# Patient Record
Sex: FEMALE | Race: WHITE | NOT HISPANIC OR LATINO | URBAN - METROPOLITAN AREA
[De-identification: names, ages, dates, MRNs, and addresses within clinical notes are randomized per-mention and may not be internally consistent; named-entity substitution may affect disease eponyms.]

---

## 2018-02-11 ENCOUNTER — EMERGENCY (EMERGENCY)
Facility: HOSPITAL | Age: 83
LOS: 1 days | Discharge: HOME | End: 2018-02-11
Attending: EMERGENCY MEDICINE | Admitting: EMERGENCY MEDICINE
Payer: MEDICARE

## 2018-02-11 VITALS
DIASTOLIC BLOOD PRESSURE: 65 MMHG | SYSTOLIC BLOOD PRESSURE: 107 MMHG | TEMPERATURE: 100 F | OXYGEN SATURATION: 95 % | HEART RATE: 102 BPM | RESPIRATION RATE: 17 BRPM

## 2018-02-11 VITALS
SYSTOLIC BLOOD PRESSURE: 128 MMHG | TEMPERATURE: 99 F | HEART RATE: 96 BPM | DIASTOLIC BLOOD PRESSURE: 73 MMHG | RESPIRATION RATE: 18 BRPM | OXYGEN SATURATION: 95 %

## 2018-02-11 LAB
ALBUMIN SERPL ELPH-MCNC: 4.1 G/DL — SIGNIFICANT CHANGE UP (ref 3.3–5)
ALP SERPL-CCNC: 69 U/L — SIGNIFICANT CHANGE UP (ref 40–120)
ALT FLD-CCNC: 13 U/L RC — SIGNIFICANT CHANGE UP (ref 10–45)
ANION GAP SERPL CALC-SCNC: 13 MMOL/L — SIGNIFICANT CHANGE UP (ref 5–17)
APPEARANCE UR: CLEAR — SIGNIFICANT CHANGE UP
APTT BLD: 141.4 SEC — CRITICAL HIGH (ref 27.5–37.4)
APTT BLD: 144.8 SEC — CRITICAL HIGH (ref 27.5–37.4)
AST SERPL-CCNC: 20 U/L — SIGNIFICANT CHANGE UP (ref 10–40)
BASE EXCESS BLDV CALC-SCNC: 3.8 MMOL/L — HIGH (ref -2–2)
BASE EXCESS BLDV CALC-SCNC: 4.3 MMOL/L — HIGH (ref -2–2)
BASOPHILS # BLD AUTO: 0 K/UL — SIGNIFICANT CHANGE UP (ref 0–0.2)
BASOPHILS NFR BLD AUTO: 0 % — SIGNIFICANT CHANGE UP (ref 0–2)
BILIRUB SERPL-MCNC: 0.5 MG/DL — SIGNIFICANT CHANGE UP (ref 0.2–1.2)
BILIRUB UR-MCNC: NEGATIVE — SIGNIFICANT CHANGE UP
BUN SERPL-MCNC: 32 MG/DL — HIGH (ref 7–23)
CA-I SERPL-SCNC: 1.14 MMOL/L — SIGNIFICANT CHANGE UP (ref 1.12–1.3)
CA-I SERPL-SCNC: 1.16 MMOL/L — SIGNIFICANT CHANGE UP (ref 1.12–1.3)
CALCIUM SERPL-MCNC: 9.3 MG/DL — SIGNIFICANT CHANGE UP (ref 8.4–10.5)
CHLORIDE BLDV-SCNC: 110 MMOL/L — HIGH (ref 96–108)
CHLORIDE BLDV-SCNC: 111 MMOL/L — HIGH (ref 96–108)
CHLORIDE SERPL-SCNC: 104 MMOL/L — SIGNIFICANT CHANGE UP (ref 96–108)
CO2 BLDV-SCNC: 30 MMOL/L — SIGNIFICANT CHANGE UP (ref 22–30)
CO2 BLDV-SCNC: 32 MMOL/L — HIGH (ref 22–30)
CO2 SERPL-SCNC: 29 MMOL/L — SIGNIFICANT CHANGE UP (ref 22–31)
COLOR SPEC: YELLOW — SIGNIFICANT CHANGE UP
CREAT SERPL-MCNC: 1.15 MG/DL — SIGNIFICANT CHANGE UP (ref 0.5–1.3)
DIFF PNL FLD: NEGATIVE — SIGNIFICANT CHANGE UP
EOSINOPHIL # BLD AUTO: 0 K/UL — SIGNIFICANT CHANGE UP (ref 0–0.5)
EOSINOPHIL NFR BLD AUTO: 0 % — SIGNIFICANT CHANGE UP (ref 0–6)
GAS PNL BLDV: 141 MMOL/L — SIGNIFICANT CHANGE UP (ref 136–145)
GAS PNL BLDV: 144 MMOL/L — SIGNIFICANT CHANGE UP (ref 136–145)
GAS PNL BLDV: SIGNIFICANT CHANGE UP
GLUCOSE BLDV-MCNC: 115 MG/DL — HIGH (ref 70–99)
GLUCOSE BLDV-MCNC: 146 MG/DL — HIGH (ref 70–99)
GLUCOSE SERPL-MCNC: 149 MG/DL — HIGH (ref 70–99)
GLUCOSE UR QL: NEGATIVE — SIGNIFICANT CHANGE UP
HCO3 BLDV-SCNC: 29 MMOL/L — SIGNIFICANT CHANGE UP (ref 21–29)
HCO3 BLDV-SCNC: 31 MMOL/L — HIGH (ref 21–29)
HCT VFR BLD CALC: 39.3 % — SIGNIFICANT CHANGE UP (ref 34.5–45)
HCT VFR BLDA CALC: 37 % — LOW (ref 39–50)
HCT VFR BLDA CALC: 40 % — SIGNIFICANT CHANGE UP (ref 39–50)
HGB BLD CALC-MCNC: 12.1 G/DL — SIGNIFICANT CHANGE UP (ref 11.5–15.5)
HGB BLD CALC-MCNC: 13.1 G/DL — SIGNIFICANT CHANGE UP (ref 11.5–15.5)
HGB BLD-MCNC: 12.8 G/DL — SIGNIFICANT CHANGE UP (ref 11.5–15.5)
INR BLD: 1.03 RATIO — SIGNIFICANT CHANGE UP (ref 0.88–1.16)
KETONES UR-MCNC: NEGATIVE — SIGNIFICANT CHANGE UP
LACTATE BLDV-MCNC: 1.3 MMOL/L — SIGNIFICANT CHANGE UP (ref 0.7–2)
LACTATE BLDV-MCNC: 2.4 MMOL/L — HIGH (ref 0.7–2)
LEUKOCYTE ESTERASE UR-ACNC: NEGATIVE — SIGNIFICANT CHANGE UP
LYMPHOCYTES # BLD AUTO: 0.7 K/UL — LOW (ref 1–3.3)
LYMPHOCYTES # BLD AUTO: 7.5 % — LOW (ref 13–44)
MCHC RBC-ENTMCNC: 29.4 PG — SIGNIFICANT CHANGE UP (ref 27–34)
MCHC RBC-ENTMCNC: 32.6 GM/DL — SIGNIFICANT CHANGE UP (ref 32–36)
MCV RBC AUTO: 90 FL — SIGNIFICANT CHANGE UP (ref 80–100)
MONOCYTES # BLD AUTO: 0.5 K/UL — SIGNIFICANT CHANGE UP (ref 0–0.9)
MONOCYTES NFR BLD AUTO: 4.9 % — SIGNIFICANT CHANGE UP (ref 2–14)
NEUTROPHILS # BLD AUTO: 8.1 K/UL — HIGH (ref 1.8–7.4)
NEUTROPHILS NFR BLD AUTO: 87.6 % — HIGH (ref 43–77)
NITRITE UR-MCNC: NEGATIVE — SIGNIFICANT CHANGE UP
OTHER CELLS CSF MANUAL: 5 ML/DL — LOW (ref 18–22)
PCO2 BLDV: 49 MMHG — SIGNIFICANT CHANGE UP (ref 35–50)
PCO2 BLDV: 57 MMHG — HIGH (ref 35–50)
PH BLDV: 7.35 — SIGNIFICANT CHANGE UP (ref 7.35–7.45)
PH BLDV: 7.39 — SIGNIFICANT CHANGE UP (ref 7.35–7.45)
PH UR: 5.5 — SIGNIFICANT CHANGE UP (ref 5–8)
PLATELET # BLD AUTO: 252 K/UL — SIGNIFICANT CHANGE UP (ref 150–400)
PO2 BLDV: 18 MMHG — LOW (ref 25–45)
PO2 BLDV: 21 MMHG — LOW (ref 25–45)
POTASSIUM BLDV-SCNC: 3.4 MMOL/L — LOW (ref 3.5–5)
POTASSIUM BLDV-SCNC: 4.1 MMOL/L — SIGNIFICANT CHANGE UP (ref 3.5–5)
POTASSIUM SERPL-MCNC: 3.9 MMOL/L — SIGNIFICANT CHANGE UP (ref 3.5–5.3)
POTASSIUM SERPL-SCNC: 3.9 MMOL/L — SIGNIFICANT CHANGE UP (ref 3.5–5.3)
PROT SERPL-MCNC: 7.5 G/DL — SIGNIFICANT CHANGE UP (ref 6–8.3)
PROT UR-MCNC: SIGNIFICANT CHANGE UP
PROTHROM AB SERPL-ACNC: 11.1 SEC — SIGNIFICANT CHANGE UP (ref 9.8–12.7)
RBC # BLD: 4.37 M/UL — SIGNIFICANT CHANGE UP (ref 3.8–5.2)
RBC # FLD: 14.3 % — SIGNIFICANT CHANGE UP (ref 10.3–14.5)
SAO2 % BLDV: 22 % — LOW (ref 67–88)
SAO2 % BLDV: 30 % — LOW (ref 67–88)
SODIUM SERPL-SCNC: 146 MMOL/L — HIGH (ref 135–145)
SP GR SPEC: 1.02 — SIGNIFICANT CHANGE UP (ref 1.01–1.02)
UROBILINOGEN FLD QL: NEGATIVE — SIGNIFICANT CHANGE UP
WBC # BLD: 9.2 K/UL — SIGNIFICANT CHANGE UP (ref 3.8–10.5)
WBC # FLD AUTO: 9.2 K/UL — SIGNIFICANT CHANGE UP (ref 3.8–10.5)

## 2018-02-11 PROCEDURE — 84132 ASSAY OF SERUM POTASSIUM: CPT

## 2018-02-11 PROCEDURE — 87040 BLOOD CULTURE FOR BACTERIA: CPT

## 2018-02-11 PROCEDURE — 83605 ASSAY OF LACTIC ACID: CPT

## 2018-02-11 PROCEDURE — 87086 URINE CULTURE/COLONY COUNT: CPT

## 2018-02-11 PROCEDURE — 85027 COMPLETE CBC AUTOMATED: CPT

## 2018-02-11 PROCEDURE — 82330 ASSAY OF CALCIUM: CPT

## 2018-02-11 PROCEDURE — 71045 X-RAY EXAM CHEST 1 VIEW: CPT

## 2018-02-11 PROCEDURE — 85610 PROTHROMBIN TIME: CPT

## 2018-02-11 PROCEDURE — 87186 SC STD MICRODIL/AGAR DIL: CPT

## 2018-02-11 PROCEDURE — 80053 COMPREHEN METABOLIC PANEL: CPT

## 2018-02-11 PROCEDURE — 81003 URINALYSIS AUTO W/O SCOPE: CPT

## 2018-02-11 PROCEDURE — 82947 ASSAY GLUCOSE BLOOD QUANT: CPT

## 2018-02-11 PROCEDURE — 99283 EMERGENCY DEPT VISIT LOW MDM: CPT | Mod: 25

## 2018-02-11 PROCEDURE — 99284 EMERGENCY DEPT VISIT MOD MDM: CPT | Mod: 25,GC

## 2018-02-11 PROCEDURE — 85014 HEMATOCRIT: CPT

## 2018-02-11 PROCEDURE — 82435 ASSAY OF BLOOD CHLORIDE: CPT

## 2018-02-11 PROCEDURE — 71045 X-RAY EXAM CHEST 1 VIEW: CPT | Mod: 26

## 2018-02-11 PROCEDURE — 85730 THROMBOPLASTIN TIME PARTIAL: CPT

## 2018-02-11 PROCEDURE — 84295 ASSAY OF SERUM SODIUM: CPT

## 2018-02-11 PROCEDURE — 93010 ELECTROCARDIOGRAM REPORT: CPT

## 2018-02-11 PROCEDURE — 82803 BLOOD GASES ANY COMBINATION: CPT

## 2018-02-11 PROCEDURE — 93005 ELECTROCARDIOGRAM TRACING: CPT

## 2018-02-11 RX ORDER — SODIUM CHLORIDE 9 MG/ML
3 INJECTION INTRAMUSCULAR; INTRAVENOUS; SUBCUTANEOUS ONCE
Qty: 0 | Refills: 0 | Status: COMPLETED | OUTPATIENT
Start: 2018-02-11 | End: 2018-02-11

## 2018-02-11 RX ORDER — SODIUM CHLORIDE 9 MG/ML
1000 INJECTION INTRAMUSCULAR; INTRAVENOUS; SUBCUTANEOUS ONCE
Qty: 0 | Refills: 0 | Status: COMPLETED | OUTPATIENT
Start: 2018-02-11 | End: 2018-02-11

## 2018-02-11 RX ORDER — QUETIAPINE FUMARATE 200 MG/1
25 TABLET, FILM COATED ORAL ONCE
Qty: 0 | Refills: 0 | Status: COMPLETED | OUTPATIENT
Start: 2018-02-11 | End: 2018-02-11

## 2018-02-11 RX ORDER — QUETIAPINE FUMARATE 200 MG/1
25 TABLET, FILM COATED ORAL ONCE
Qty: 0 | Refills: 0 | Status: DISCONTINUED | OUTPATIENT
Start: 2018-02-11 | End: 2018-02-11

## 2018-02-11 RX ADMIN — QUETIAPINE FUMARATE 25 MILLIGRAM(S): 200 TABLET, FILM COATED ORAL at 17:16

## 2018-02-11 RX ADMIN — SODIUM CHLORIDE 3 MILLILITER(S): 9 INJECTION INTRAMUSCULAR; INTRAVENOUS; SUBCUTANEOUS at 13:10

## 2018-02-11 RX ADMIN — SODIUM CHLORIDE 2000 MILLILITER(S): 9 INJECTION INTRAMUSCULAR; INTRAVENOUS; SUBCUTANEOUS at 13:37

## 2018-02-11 NOTE — ED PROVIDER NOTE - PROGRESS NOTE DETAILS
**ATTENDING ADDENDUM (Dr. Elvin Patel): reviewed transfer documentation from the [facility] nursing [facility]. Advance directives noted. HCP: Daughter Melissa Whipple [phone], [phone]. PCP: Guzman Nolasco - [phone]. PTT elevated, unclear why, will repeat. VBG elevated will repeat after 1L, patient remains without abd pain- vomiting/diarrhea. will PO challenge. Spoke with Feral patient at baseline MS prior to sending does sundown and get agitated at times. and currently AOx1 slightly agitated but cooperative. will d/c back to NH -Alisha DO PTT still elevated, reviewed patient medications not on heparin or DOAC that would cause elevation. normal PT/INR, normal LFTs. no active bleeding. No clear etiology of isolated elevated PTT, without active bleeding will recommend d/c and outpatient Follow up -Alisha VIDAL VBG lactate resolved, tolerating liquids will feed. sundowning, will give PO home serqouel and put on 1:1 for sfety. will d/c -Alisha DO Attending Note (Jono): patient signed out pending repeat labs.  lactate normalized. ptt still elevated, unclear etiology. unlikely clinically significant.  recommend outpatient trending and follow up with pcp. PTT elevated, unclear why, will repeat. VBG elevated will repeat after 1L, patient remains without abd pain- vomiting/diarrhea. will PO challenge. Spoke with Bristal patient at baseline MS prior to sending does sundown and get agitated at times. and currently AOx1 slightly agitated but cooperative. will d/c back to NH -Alisha DO  **ATTENDING ADDENDUM (Dr. Elvin Patel): agree with above notation by Alisha. During my shift, exploration of risks, benefits and alternatives reviewed with ED team regarding disposition. If patient remains at current status, discharge back to assisted living facility may be favored over inpatient admission for these symptoms. ED team Will continue to observe and monitor closely.

## 2018-02-11 NOTE — ED PROVIDER NOTE - UNABLE TO OBTAIN
AOX1 Dementia AOx1 AOX1 **ATTENDING ADDENDUM (Dr. Elvin Patel): Exploration of CC, HPI and review of systems limited by patient's chronic mental status. AOx1 **ATTENDING ADDENDUM (Dr. Elvin Patel): Exploration of CC, HPI and review of systems limited by patient's chronic mental status.

## 2018-02-11 NOTE — ED PROVIDER NOTE - CARE PLAN
Principal Discharge DX:	Nausea and vomiting  Assessment and plan of treatment:	1. Return to ED for worsening, progressive or any other concerning symptoms   2. Follow up with your primary care doctor in 2-3days  Secondary Diagnosis:	Diarrhea  Secondary Diagnosis:	Dehydration Principal Discharge DX:	Nausea and vomiting  Assessment and plan of treatment:	1. Return to ED for worsening, progressive or any other concerning symptoms   2. Follow up with your primary care doctor in 2-3days  3. Patient has elevated PTT, unclear etiology please evaluate further  Secondary Diagnosis:	Diarrhea  Secondary Diagnosis:	Dehydration Principal Discharge DX:	Nausea and vomiting  Goal:	ATTENDING ADDENDUM (Dr. Elvin Patel): Goals of care include resolution of emergent/urgent symptoms and concerns, and restoration to baseline level of homeostasis.  Assessment and plan of treatment:	1. Return to ED for worsening, progressive or any other concerning symptoms   2. Follow up with your primary care doctor in 2-3days  3. Patient has elevated PTT, unclear etiology please evaluate further  Secondary Diagnosis:	Diarrhea  Secondary Diagnosis:	Dehydration

## 2018-02-11 NOTE — ED PROVIDER NOTE - ATTENDING CONTRIBUTION TO CARE
**ATTENDING ADDENDUM (Dr. Elvin Patel): I attest that I have directly examined this patient and reviewed and formulated the diagnostic and therapeutic management plan in collaboration with the EM resident. Please see MDM note and remainder of EMR for findings from CC, HPI, ROS, and PE. (Alisha)

## 2018-02-11 NOTE — ED PROVIDER NOTE - PHYSICAL EXAMINATION
Gen: NAD, AOx1  Head: NCAT  HEENT: PERRL, oral mucosa moist, normal conjunctiva, neck supple  Lung: CTAB, no respiratory distress  CV: tachy, regular, +systolic murmur, Normal perfusion  Abd: soft, NTND  MSK: No edema, no visible deformities, +thoracic kyphosis  Neuro: moving all extremities equally   Skin: No rash   Psych: normal affect

## 2018-02-11 NOTE — ED PROVIDER NOTE - OBJECTIVE STATEMENT
89yo F BIBEMS from NH, H/O dementia/CHF/HTN with vomiting/diarrhea x1 day, no abd pain. patient demented but denies fevers/chest pain or SOB. +weakness. no urinary complaints       PCP- Dr. Nolasco  the [facility] 89yo F BIBEMS from NH, H/O dementia/CHF/HTN with vomiting/diarrhea x1 day, no abd pain. patient demented but denies fevers/chest pain or SOB. +weakness. no urinary complaints   PCP- Dr. Nolasco  the [facility]  **ATTENDING ADDENDUM (Dr. Elvin Patel): I attest that I have directly and personally interviewed and examined this patient and elicited a comparable history of present illness and review of systems as documented, along with my EM resident. I attest that I have made significant contributions to the documentation where necessary and as noted in the EMR.

## 2018-02-11 NOTE — ED PROVIDER NOTE - CONTEXT
**ATTENDING ADDENDUM (Dr. Elvin Patel): DENIES recent travel. NO obvious sick contacts, but lives in an assisted living facility.

## 2018-02-11 NOTE — ED ADULT NURSE NOTE - OBJECTIVE STATEMENT
Pt is a 89 yo female Pt is a 89 yo female sent here from NH for vomiting and diarrhea x 1 day, Pt has a hx of dementia and is A&Ox1 at baseline. Pt denies any CP or SOB no N/V/D no cough fever or chills no abdominal tenderness. Pt has pmh of HTN, depression and dementia

## 2018-02-11 NOTE — ED PROVIDER NOTE - RESPIRATORY, MLM
Breath sounds clear and equal bilaterally. **ATTENDING ADDENDUM (Dr. Elvin Patel): NO wheezing, rales, rhonchi, crackles, stridor, drooling, retractions, nasal flaring, or tripoding.

## 2018-02-11 NOTE — ED PROVIDER NOTE - EYES, MLM
**ATTENDING ADDENDUM (Dr. Elvin Patel): Extraocular muscle movements intact. Clear corneas bilaterally, pupils equal and round.

## 2018-02-11 NOTE — ED PROVIDER NOTE - PLAN OF CARE
1. Return to ED for worsening, progressive or any other concerning symptoms   2. Follow up with your primary care doctor in 2-3days 1. Return to ED for worsening, progressive or any other concerning symptoms   2. Follow up with your primary care doctor in 2-3days  3. Patient has elevated PTT, unclear etiology please evaluate further ATTENDING ADDENDUM (Dr. Elvin Patel): Goals of care include resolution of emergent/urgent symptoms and concerns, and restoration to baseline level of homeostasis.

## 2018-02-11 NOTE — ED PROVIDER NOTE - SHIFT CHANGE DETAILS
**ATTENDING ADDENDUM - HANDOFF (from Dr. Elvin Patel): (1) Follow up repeat laboratory studies (PTT) (2) serial reevaluation / observation (3) disposition pending, likely discharge home (PTT noted, but etiology unclear [do not suspect liver failure or occult anticoagulation; patient not on medications e.g. heparin or LWMH as per records] and significance uncertain)

## 2018-02-11 NOTE — ED PROVIDER NOTE - CHIEF COMPLAINT
The patient is a 89y Female complaining of The patient is a 89y Female complaining of nausea, vomiting, and diarrhea over the past day.

## 2018-02-11 NOTE — ED PROVIDER NOTE - GASTROINTESTINAL, MLM
Abdomen soft, non-tender **ATTENDING ADDENDUM (Dr. Elvin Patel): NO guarding, rebound, or rigidity. NO pulsatile or non-pulsatile masses. NO hernias. NO obvious hepatosplenomegaly. NO findings indicating peritonitis e.g. NO tenderness to deep palpation or percussion.

## 2018-02-11 NOTE — ED PROVIDER NOTE - CHPI ED SYMPTOMS POS
DECREASED EATING/DRINKING/NAUSEA/DIARRHEA/VOMITING/**ATTENDING ADDENDUM (Dr. Elvin Patel): generalized weakness

## 2018-02-11 NOTE — ED PROVIDER NOTE - MEDICAL DECISION MAKING DETAILS
**ATTENDING MEDICAL DECISION MAKING/SYNTHESIS (Dr. Elvin Patel): I have reviewed the Chief Complaint, the HPI, the ROS, and have directly performed and confirmed the findings on the Physical Examination. I have reviewed the medical decision making with all providers, as applicable. The PROBLEM REPRESENTATION at this time is: 89-year-old woman with history of Hypertension, diastolic dysfunction, dementia (with advance directives, with allergies to sulfa and aspirin) now presenting from [facility] with diarrhea and vomiting noted over the past 2 days. The MOST LIKELY DIAGNOSIS, and the LIST OF DIFFERENTIAL DIAGNOSES, includes (but is not limited to) the following: dehydration, electrolyte-metabolic-endocrine derangements, serious bacterial infection or sepsis/severe sepsis e.g. urinary tract infection, pyelonephritis, viral gastroenteritis, surgical abdominal process (ischemia, acute appendicitis, acute biliary disease (e.g. cholelithiasis, cholecystitis, or cholangitis) or equivalent), viral syndrome, vascular etiology, urologic condition (both considered, unlikely), occult neurologic/intracerebral process (unlikely given presentation and clinical findings), acute coronary syndrome (unlikely given presentation and clinical findings). The likelihood of each of these diagnoses has been appropriately considered in the context of this patient's presentation and my evaluation. PLAN: as described in EMR, including diagnostics, therapeutics and consultation as clinically warranted. I will continue to reevaluate the patient, including the results of all testing, and monitor response to therapy throughout the patient's course in the ED. **ATTENDING MEDICAL DECISION MAKING/SYNTHESIS (Dr. Elvin Patel): I have reviewed the Chief Complaint, the HPI, the ROS, and have directly performed and confirmed the findings on the Physical Examination. I have reviewed the medical decision making with all providers, as applicable. The PROBLEM REPRESENTATION at this time is: 89-year-old woman with history of Hypertension, diastolic dysfunction, dementia (with advance directives, with allergies to sulfa and aspirin) now presenting from [facility] with diarrhea and vomiting noted over the past 2 days without hematemesis, melena, hematochezia, or coffee-ground emesis. The MOST LIKELY DIAGNOSIS, and the LIST OF DIFFERENTIAL DIAGNOSES, includes (but is not limited to) the following: dehydration, electrolyte-metabolic-endocrine derangements, serious bacterial infection or sepsis/severe sepsis e.g. urinary tract infection, pyelonephritis, viral gastroenteritis, surgical abdominal process (ischemia, acute appendicitis, acute biliary disease (e.g. cholelithiasis, cholecystitis, or cholangitis) or equivalent), viral syndrome, vascular etiology, urologic condition (both considered, unlikely), occult neurologic/intracerebral process (unlikely given presentation and clinical findings), acute coronary syndrome (unlikely given presentation and clinical findings). The likelihood of each of these diagnoses has been appropriately considered in the context of this patient's presentation and my evaluation. PLAN: as described in EMR, including diagnostics, therapeutics and consultation as clinically warranted. I will continue to reevaluate the patient, including the results of all testing, and monitor response to therapy throughout the patient's course in the ED.

## 2018-02-13 LAB
-  AMPICILLIN: SIGNIFICANT CHANGE UP
-  CIPROFLOXACIN: SIGNIFICANT CHANGE UP
-  NITROFURANTOIN: SIGNIFICANT CHANGE UP
-  TETRACYCLINE: SIGNIFICANT CHANGE UP
-  VANCOMYCIN: SIGNIFICANT CHANGE UP
CULTURE RESULTS: SIGNIFICANT CHANGE UP
METHOD TYPE: SIGNIFICANT CHANGE UP
ORGANISM # SPEC MICROSCOPIC CNT: SIGNIFICANT CHANGE UP
ORGANISM # SPEC MICROSCOPIC CNT: SIGNIFICANT CHANGE UP
SPECIMEN SOURCE: SIGNIFICANT CHANGE UP

## 2018-02-13 NOTE — ED POST DISCHARGE NOTE - OTHER COMMUNICATION
Spoke with patient's daughter as patient is in assisted living and daughter is her primary care taker. Reported that this enterococcus while normally found in the stool could be contaminate from her diarrhea but to be careful will start macrobid for 7 days. daughter verbalizes understanding and agreement with starting abx and rechecking urine sample after completed. will pick it up and bring to her. -Ivory Fountain PA-C

## 2018-02-14 RX ORDER — NITROFURANTOIN MACROCRYSTAL 50 MG
1 CAPSULE ORAL
Qty: 14 | Refills: 0 | OUTPATIENT
Start: 2018-02-14 | End: 2018-02-20

## 2018-02-16 LAB
CULTURE RESULTS: SIGNIFICANT CHANGE UP
CULTURE RESULTS: SIGNIFICANT CHANGE UP
SPECIMEN SOURCE: SIGNIFICANT CHANGE UP
SPECIMEN SOURCE: SIGNIFICANT CHANGE UP

## 2018-04-04 ENCOUNTER — EMERGENCY (EMERGENCY)
Facility: HOSPITAL | Age: 83
LOS: 1 days | Discharge: HOME | End: 2018-04-04
Attending: EMERGENCY MEDICINE | Admitting: EMERGENCY MEDICINE
Payer: MEDICARE

## 2018-04-04 VITALS
OXYGEN SATURATION: 97 % | RESPIRATION RATE: 20 BRPM | SYSTOLIC BLOOD PRESSURE: 143 MMHG | HEART RATE: 63 BPM | DIASTOLIC BLOOD PRESSURE: 71 MMHG | TEMPERATURE: 98 F

## 2018-04-04 VITALS
DIASTOLIC BLOOD PRESSURE: 75 MMHG | RESPIRATION RATE: 19 BRPM | SYSTOLIC BLOOD PRESSURE: 154 MMHG | HEART RATE: 64 BPM | TEMPERATURE: 98 F | OXYGEN SATURATION: 97 %

## 2018-04-04 LAB
ANION GAP SERPL CALC-SCNC: 15 MMOL/L — SIGNIFICANT CHANGE UP (ref 5–17)
BUN SERPL-MCNC: 34 MG/DL — HIGH (ref 7–23)
CALCIUM SERPL-MCNC: 9.4 MG/DL — SIGNIFICANT CHANGE UP (ref 8.4–10.5)
CHLORIDE SERPL-SCNC: 104 MMOL/L — SIGNIFICANT CHANGE UP (ref 96–108)
CO2 SERPL-SCNC: 24 MMOL/L — SIGNIFICANT CHANGE UP (ref 22–31)
CREAT SERPL-MCNC: 1.19 MG/DL — SIGNIFICANT CHANGE UP (ref 0.5–1.3)
GLUCOSE SERPL-MCNC: 155 MG/DL — HIGH (ref 70–99)
HCT VFR BLD CALC: 33.5 % — LOW (ref 34.5–45)
HGB BLD-MCNC: 11.1 G/DL — LOW (ref 11.5–15.5)
MCHC RBC-ENTMCNC: 29.9 PG — SIGNIFICANT CHANGE UP (ref 27–34)
MCHC RBC-ENTMCNC: 33.2 GM/DL — SIGNIFICANT CHANGE UP (ref 32–36)
MCV RBC AUTO: 90.1 FL — SIGNIFICANT CHANGE UP (ref 80–100)
PLATELET # BLD AUTO: 248 K/UL — SIGNIFICANT CHANGE UP (ref 150–400)
POTASSIUM SERPL-MCNC: 3.6 MMOL/L — SIGNIFICANT CHANGE UP (ref 3.5–5.3)
POTASSIUM SERPL-SCNC: 3.6 MMOL/L — SIGNIFICANT CHANGE UP (ref 3.5–5.3)
RBC # BLD: 3.72 M/UL — LOW (ref 3.8–5.2)
RBC # FLD: 14.1 % — SIGNIFICANT CHANGE UP (ref 10.3–14.5)
SODIUM SERPL-SCNC: 143 MMOL/L — SIGNIFICANT CHANGE UP (ref 135–145)
TROPONIN T SERPL-MCNC: <0.01 NG/ML — SIGNIFICANT CHANGE UP (ref 0–0.06)
WBC # BLD: 6.3 K/UL — SIGNIFICANT CHANGE UP (ref 3.8–10.5)
WBC # FLD AUTO: 6.3 K/UL — SIGNIFICANT CHANGE UP (ref 3.8–10.5)

## 2018-04-04 PROCEDURE — 73000 X-RAY EXAM OF COLLAR BONE: CPT | Mod: 26,LT

## 2018-04-04 PROCEDURE — 93010 ELECTROCARDIOGRAM REPORT: CPT | Mod: GC

## 2018-04-04 PROCEDURE — 73060 X-RAY EXAM OF HUMERUS: CPT | Mod: 26,LT

## 2018-04-04 PROCEDURE — 73030 X-RAY EXAM OF SHOULDER: CPT

## 2018-04-04 PROCEDURE — 73060 X-RAY EXAM OF HUMERUS: CPT

## 2018-04-04 PROCEDURE — 93005 ELECTROCARDIOGRAM TRACING: CPT

## 2018-04-04 PROCEDURE — 70450 CT HEAD/BRAIN W/O DYE: CPT | Mod: 26

## 2018-04-04 PROCEDURE — 73080 X-RAY EXAM OF ELBOW: CPT

## 2018-04-04 PROCEDURE — 71045 X-RAY EXAM CHEST 1 VIEW: CPT

## 2018-04-04 PROCEDURE — 71045 X-RAY EXAM CHEST 1 VIEW: CPT | Mod: 26

## 2018-04-04 PROCEDURE — 70450 CT HEAD/BRAIN W/O DYE: CPT

## 2018-04-04 PROCEDURE — 73080 X-RAY EXAM OF ELBOW: CPT | Mod: 26,LT

## 2018-04-04 PROCEDURE — 99285 EMERGENCY DEPT VISIT HI MDM: CPT | Mod: 25,GC

## 2018-04-04 PROCEDURE — 85027 COMPLETE CBC AUTOMATED: CPT

## 2018-04-04 PROCEDURE — 73030 X-RAY EXAM OF SHOULDER: CPT | Mod: 26,LT

## 2018-04-04 PROCEDURE — 99284 EMERGENCY DEPT VISIT MOD MDM: CPT | Mod: 25

## 2018-04-04 PROCEDURE — 84484 ASSAY OF TROPONIN QUANT: CPT

## 2018-04-04 PROCEDURE — 73000 X-RAY EXAM OF COLLAR BONE: CPT

## 2018-04-04 PROCEDURE — 80048 BASIC METABOLIC PNL TOTAL CA: CPT

## 2018-04-04 NOTE — ED PROVIDER NOTE - PMH
Cataracts, bilateral    Depression    Glaucoma    HTN (hypertension)    Hypercholesterolemia Aortic stenosis    Cataracts, bilateral    Depression    Glaucoma    Heart failure    Hemophilia A    HTN (hypertension)    Hypercholesterolemia

## 2018-04-04 NOTE — ED PROVIDER NOTE - PLAN OF CARE
You were sent to the ER because of a fall, and concern for trauma to your shoulder. A check of your routine labs and troponin were within normal limits, and your EKG had no changes. A CT scan of your head showed no bleeding or other pathology. XRays of your left clavicle, shoulder, humerus, and elbow showed no fractures or dislocations. It is safe for you to return to the [facility], and it has been discussed with the staff there and your daughter that you should get physical therapy for your left shoulder. If you develop new or worsening symptoms please return to the ER immediately.

## 2018-04-04 NOTE — ED PROVIDER NOTE - CARE PLAN
Principal Discharge DX:	Fall Principal Discharge DX:	Fall  Assessment and plan of treatment:	You were sent to the ER because of a fall, and concern for trauma to your shoulder. A check of your routine labs and troponin were within normal limits, and your EKG had no changes. A CT scan of your head showed no bleeding or other pathology. XRays of your left clavicle, shoulder, humerus, and elbow showed no fractures or dislocations. It is safe for you to return to the [facility], and it has been discussed with the staff there and your daughter that you should get physical therapy for your left shoulder. If you develop new or worsening symptoms please return to the ER immediately.

## 2018-04-04 NOTE — ED PROVIDER NOTE - CARDIAC, MLM
Normal rate, regular rhythm.  Heart sounds S1, S2.  No murmurs, rubs or gallops. No JVD, no LE edema

## 2018-04-04 NOTE — ED ADULT NURSE NOTE - OBJECTIVE STATEMENT
Pt BIBA from [place] [facility] for "fall."  As per EMS pt fell one week ago and is being sent to ER for evaluation of left arm pain, no other information available from EMS except that pt has hx of dementia but is supposedly at her baseline mentation now.  Pt is a&o 2 (person, place), conversive, unable to give any coherent information related to her injury, left elbow with large area of ecchymosis and hematoma, c/o pain throughout forearm up to area of humeral head, moves arm with pain, denies cp, sob, nvd, abd pain, urinary symptoms, numbness/tingling, although pt not completely reliable source of information.  No other ecchymosis/wounds/deformities to skin. Pt BIBA from [place] for "fall."  As per EMS pt fell one week ago and is being sent to ER for evaluation of left arm pain, no other information available from EMS except that pt has hx of dementia but is supposedly at her baseline mentation now.  Pt is a&o 2 (person, place), conversive, unable to give any coherent information related to her injury, left elbow with large area of ecchymosis and hematoma, c/o pain throughout forearm up to shoulder, moves arm with pain, denies cp, sob, nvd, abd pain, urinary symptoms, numbness/tingling, although pt not completely reliable source of information.  No other ecchymosis/wounds/deformities to skin.
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
## 2018-04-04 NOTE — ED PROVIDER NOTE - NEUROLOGICAL, MLM
AOx2 (did not know date) but appears pleasantly confused, unable to provide history, tangential thinking. No focal deficits

## 2018-04-04 NOTE — ED PROVIDER NOTE - MEDICAL DECISION MAKING DETAILS
89F w/PMHx HTN, HLD, CHF (unknown EF), aortic stenosis, hemophilia A, ABL anemia 2/2 GIB, dep/mood d/o, depression, glaucoma, thoracic kyphosis sent in from [facility] for shoulder eval after fall. Will check routine labs, trend trop, EKG, CXR, XR clavicle/shoulder/humerus/elbow. Pain control if needed, monitor and reassess
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
## 2018-04-04 NOTE — ED PROVIDER NOTE - MUSCULOSKELETAL, MLM
Kyphotic spine, shoulders appear symmetric on exam, pain to palp on L trap, shoulder joint w/o abn on palp, no effusion. Pain limited in all ROM but able to do minimal shoulder flex and abd (unable to perform any further exam maneuvers). Elbow joint w/norm ROM, no joint effusion

## 2018-04-04 NOTE — ED PROVIDER NOTE - CONSTITUTIONAL, MLM
normal... Well appearing, well nourished, awake, alert, oriented to person, place, but not time. NAD

## 2018-11-21 NOTE — ED PROVIDER NOTE - CPE EDP CARDIAC NORM
Verified Results  Fecal Occult Blood, Tube Test [id number] 12:01AM LILLIAN BUTLER   [Dec 20, 2017 12:12AM LILLIAN BUTLER]  No blood in stool. check annually.     Test Name Result Flag Reference   OCCULT BLOOD, TUBE TEST NEGATIVE  NEGATIVE       
normal...

## 2018-12-24 ENCOUNTER — EMERGENCY (EMERGENCY)
Facility: HOSPITAL | Age: 83
LOS: 1 days | Discharge: HOME | End: 2018-12-24
Attending: STUDENT IN AN ORGANIZED HEALTH CARE EDUCATION/TRAINING PROGRAM
Payer: MEDICARE

## 2018-12-24 VITALS
WEIGHT: 128.09 LBS | DIASTOLIC BLOOD PRESSURE: 80 MMHG | SYSTOLIC BLOOD PRESSURE: 124 MMHG | HEIGHT: 64 IN | HEART RATE: 76 BPM | RESPIRATION RATE: 18 BRPM

## 2018-12-24 VITALS
OXYGEN SATURATION: 97 % | HEART RATE: 99 BPM | RESPIRATION RATE: 20 BRPM | DIASTOLIC BLOOD PRESSURE: 71 MMHG | SYSTOLIC BLOOD PRESSURE: 117 MMHG | TEMPERATURE: 99 F

## 2018-12-24 PROBLEM — I35.0 NONRHEUMATIC AORTIC (VALVE) STENOSIS: Chronic | Status: ACTIVE | Noted: 2018-04-04

## 2018-12-24 PROBLEM — D66 HEREDITARY FACTOR VIII DEFICIENCY: Chronic | Status: ACTIVE | Noted: 2018-04-04

## 2018-12-24 PROBLEM — I50.9 HEART FAILURE, UNSPECIFIED: Chronic | Status: ACTIVE | Noted: 2018-04-04

## 2018-12-24 LAB
ALBUMIN SERPL ELPH-MCNC: 4.4 G/DL — SIGNIFICANT CHANGE UP (ref 3.3–5)
ALP SERPL-CCNC: 84 U/L — SIGNIFICANT CHANGE UP (ref 40–120)
ALT FLD-CCNC: 10 U/L — SIGNIFICANT CHANGE UP (ref 10–45)
ANION GAP SERPL CALC-SCNC: 14 MMOL/L — SIGNIFICANT CHANGE UP (ref 5–17)
APPEARANCE UR: CLEAR — SIGNIFICANT CHANGE UP
APTT BLD: 141.4 SEC — CRITICAL HIGH (ref 27.5–36.3)
AST SERPL-CCNC: 20 U/L — SIGNIFICANT CHANGE UP (ref 10–40)
BASOPHILS # BLD AUTO: 0 K/UL — SIGNIFICANT CHANGE UP (ref 0–0.2)
BASOPHILS NFR BLD AUTO: 0.3 % — SIGNIFICANT CHANGE UP (ref 0–2)
BILIRUB SERPL-MCNC: 0.7 MG/DL — SIGNIFICANT CHANGE UP (ref 0.2–1.2)
BILIRUB UR-MCNC: NEGATIVE — SIGNIFICANT CHANGE UP
BUN SERPL-MCNC: 17 MG/DL — SIGNIFICANT CHANGE UP (ref 7–23)
CALCIUM SERPL-MCNC: 10 MG/DL — SIGNIFICANT CHANGE UP (ref 8.4–10.5)
CHLORIDE SERPL-SCNC: 100 MMOL/L — SIGNIFICANT CHANGE UP (ref 96–108)
CK SERPL-CCNC: 99 U/L — SIGNIFICANT CHANGE UP (ref 25–170)
CO2 SERPL-SCNC: 28 MMOL/L — SIGNIFICANT CHANGE UP (ref 22–31)
COLOR SPEC: SIGNIFICANT CHANGE UP
CREAT SERPL-MCNC: 0.77 MG/DL — SIGNIFICANT CHANGE UP (ref 0.5–1.3)
DIFF PNL FLD: NEGATIVE — SIGNIFICANT CHANGE UP
EOSINOPHIL # BLD AUTO: 0.1 K/UL — SIGNIFICANT CHANGE UP (ref 0–0.5)
EOSINOPHIL NFR BLD AUTO: 1.1 % — SIGNIFICANT CHANGE UP (ref 0–6)
GAS PNL BLDV: SIGNIFICANT CHANGE UP
GLUCOSE SERPL-MCNC: 119 MG/DL — HIGH (ref 70–99)
GLUCOSE UR QL: NEGATIVE — SIGNIFICANT CHANGE UP
HCT VFR BLD CALC: 37.7 % — SIGNIFICANT CHANGE UP (ref 34.5–45)
HGB BLD-MCNC: 12.5 G/DL — SIGNIFICANT CHANGE UP (ref 11.5–15.5)
INR BLD: 1.06 RATIO — SIGNIFICANT CHANGE UP (ref 0.88–1.16)
KETONES UR-MCNC: ABNORMAL
LEUKOCYTE ESTERASE UR-ACNC: NEGATIVE — SIGNIFICANT CHANGE UP
LYMPHOCYTES # BLD AUTO: 1.5 K/UL — SIGNIFICANT CHANGE UP (ref 1–3.3)
LYMPHOCYTES # BLD AUTO: 12.5 % — LOW (ref 13–44)
MCHC RBC-ENTMCNC: 28.8 PG — SIGNIFICANT CHANGE UP (ref 27–34)
MCHC RBC-ENTMCNC: 33.3 GM/DL — SIGNIFICANT CHANGE UP (ref 32–36)
MCV RBC AUTO: 86.5 FL — SIGNIFICANT CHANGE UP (ref 80–100)
MONOCYTES # BLD AUTO: 0.7 K/UL — SIGNIFICANT CHANGE UP (ref 0–0.9)
MONOCYTES NFR BLD AUTO: 5.7 % — SIGNIFICANT CHANGE UP (ref 2–14)
NEUTROPHILS # BLD AUTO: 9.7 K/UL — HIGH (ref 1.8–7.4)
NEUTROPHILS NFR BLD AUTO: 80.4 % — HIGH (ref 43–77)
NITRITE UR-MCNC: NEGATIVE — SIGNIFICANT CHANGE UP
PH UR: 6 — SIGNIFICANT CHANGE UP (ref 5–8)
PLATELET # BLD AUTO: 264 K/UL — SIGNIFICANT CHANGE UP (ref 150–400)
POTASSIUM SERPL-MCNC: 4 MMOL/L — SIGNIFICANT CHANGE UP (ref 3.5–5.3)
POTASSIUM SERPL-SCNC: 4 MMOL/L — SIGNIFICANT CHANGE UP (ref 3.5–5.3)
PROT SERPL-MCNC: 7.6 G/DL — SIGNIFICANT CHANGE UP (ref 6–8.3)
PROT UR-MCNC: SIGNIFICANT CHANGE UP
PROTHROM AB SERPL-ACNC: 12.1 SEC — SIGNIFICANT CHANGE UP (ref 10–12.9)
RBC # BLD: 4.36 M/UL — SIGNIFICANT CHANGE UP (ref 3.8–5.2)
RBC # FLD: 14.7 % — HIGH (ref 10.3–14.5)
SODIUM SERPL-SCNC: 142 MMOL/L — SIGNIFICANT CHANGE UP (ref 135–145)
SP GR SPEC: 1.02 — SIGNIFICANT CHANGE UP (ref 1.01–1.02)
TROPONIN T, HIGH SENSITIVITY RESULT: 30 NG/L — SIGNIFICANT CHANGE UP (ref 0–51)
UROBILINOGEN FLD QL: NEGATIVE — SIGNIFICANT CHANGE UP
WBC # BLD: 12.1 K/UL — HIGH (ref 3.8–10.5)
WBC # FLD AUTO: 12.1 K/UL — HIGH (ref 3.8–10.5)

## 2018-12-24 PROCEDURE — 85730 THROMBOPLASTIN TIME PARTIAL: CPT

## 2018-12-24 PROCEDURE — 70450 CT HEAD/BRAIN W/O DYE: CPT

## 2018-12-24 PROCEDURE — 84295 ASSAY OF SERUM SODIUM: CPT

## 2018-12-24 PROCEDURE — 73562 X-RAY EXAM OF KNEE 3: CPT | Mod: 26,LT

## 2018-12-24 PROCEDURE — 96374 THER/PROPH/DIAG INJ IV PUSH: CPT | Mod: XU

## 2018-12-24 PROCEDURE — 93010 ELECTROCARDIOGRAM REPORT: CPT | Mod: GC

## 2018-12-24 PROCEDURE — 73562 X-RAY EXAM OF KNEE 3: CPT

## 2018-12-24 PROCEDURE — 81003 URINALYSIS AUTO W/O SCOPE: CPT

## 2018-12-24 PROCEDURE — 82803 BLOOD GASES ANY COMBINATION: CPT

## 2018-12-24 PROCEDURE — 72170 X-RAY EXAM OF PELVIS: CPT

## 2018-12-24 PROCEDURE — 82330 ASSAY OF CALCIUM: CPT

## 2018-12-24 PROCEDURE — 73502 X-RAY EXAM HIP UNI 2-3 VIEWS: CPT

## 2018-12-24 PROCEDURE — 84132 ASSAY OF SERUM POTASSIUM: CPT

## 2018-12-24 PROCEDURE — 70450 CT HEAD/BRAIN W/O DYE: CPT | Mod: 26

## 2018-12-24 PROCEDURE — 51701 INSERT BLADDER CATHETER: CPT

## 2018-12-24 PROCEDURE — 85027 COMPLETE CBC AUTOMATED: CPT

## 2018-12-24 PROCEDURE — 82550 ASSAY OF CK (CPK): CPT

## 2018-12-24 PROCEDURE — 87186 SC STD MICRODIL/AGAR DIL: CPT

## 2018-12-24 PROCEDURE — 87086 URINE CULTURE/COLONY COUNT: CPT

## 2018-12-24 PROCEDURE — 99284 EMERGENCY DEPT VISIT MOD MDM: CPT | Mod: GC,25

## 2018-12-24 PROCEDURE — 71045 X-RAY EXAM CHEST 1 VIEW: CPT | Mod: 26

## 2018-12-24 PROCEDURE — 93005 ELECTROCARDIOGRAM TRACING: CPT | Mod: XU

## 2018-12-24 PROCEDURE — 73502 X-RAY EXAM HIP UNI 2-3 VIEWS: CPT | Mod: 26,LT

## 2018-12-24 PROCEDURE — 71045 X-RAY EXAM CHEST 1 VIEW: CPT

## 2018-12-24 PROCEDURE — 80053 COMPREHEN METABOLIC PANEL: CPT

## 2018-12-24 PROCEDURE — 72125 CT NECK SPINE W/O DYE: CPT

## 2018-12-24 PROCEDURE — 85610 PROTHROMBIN TIME: CPT

## 2018-12-24 PROCEDURE — 82947 ASSAY GLUCOSE BLOOD QUANT: CPT

## 2018-12-24 PROCEDURE — 83605 ASSAY OF LACTIC ACID: CPT

## 2018-12-24 PROCEDURE — 72170 X-RAY EXAM OF PELVIS: CPT | Mod: 26,59

## 2018-12-24 PROCEDURE — 82435 ASSAY OF BLOOD CHLORIDE: CPT

## 2018-12-24 PROCEDURE — 96372 THER/PROPH/DIAG INJ SC/IM: CPT | Mod: XU

## 2018-12-24 PROCEDURE — 99284 EMERGENCY DEPT VISIT MOD MDM: CPT | Mod: 25

## 2018-12-24 PROCEDURE — 72125 CT NECK SPINE W/O DYE: CPT | Mod: 26

## 2018-12-24 PROCEDURE — 84484 ASSAY OF TROPONIN QUANT: CPT

## 2018-12-24 PROCEDURE — 85014 HEMATOCRIT: CPT

## 2018-12-24 RX ORDER — MIDAZOLAM HYDROCHLORIDE 1 MG/ML
2 INJECTION, SOLUTION INTRAMUSCULAR; INTRAVENOUS ONCE
Qty: 0 | Refills: 0 | Status: DISCONTINUED | OUTPATIENT
Start: 2018-12-24 | End: 2018-12-24

## 2018-12-24 RX ORDER — HALOPERIDOL DECANOATE 100 MG/ML
5 INJECTION INTRAMUSCULAR ONCE
Qty: 0 | Refills: 0 | Status: COMPLETED | OUTPATIENT
Start: 2018-12-24 | End: 2018-12-24

## 2018-12-24 RX ADMIN — MIDAZOLAM HYDROCHLORIDE 2 MILLIGRAM(S): 1 INJECTION, SOLUTION INTRAMUSCULAR; INTRAVENOUS at 10:41

## 2018-12-24 RX ADMIN — HALOPERIDOL DECANOATE 5 MILLIGRAM(S): 100 INJECTION INTRAMUSCULAR at 08:51

## 2018-12-24 NOTE — ED ADULT NURSE NOTE - OBJECTIVE STATEMENT
89 yo F presents to ED via EMS from assisted living c/o R side pain following unwitnessed fall. Pt A&Ox1 at baseline per EMS. EMS reports pt was found on ground next to chair in her room, staff at assisted living unsure how long she was down for. Pt currently does not report pain. EMS reports pt c/o R sided pain upon their arrival. Pt uncooperative for exam, no obvious deformities, shortening, or rotation of extremities. Pt denies any CP, SOB, N/V, fever, chills, urinary complaints, constipation, diarrhea, HA, dizziness, weakness. Lungs CTA, +central pulses. Abdomen soft, not tender, not distended. Ambulating w/ steady gait, safety and comfort maintained, no acute distress noted at this time. 89 yo F presents to ED via EMS from assisted living c/o unwitnessed fall. Pt A&Ox1 at baseline per EMS. EMS reports pt was found on ground next to chair in her room, staff at assisted living unsure how long she was down for or how she fell. Pt currently does not report pain at rest, w/ movement pt c/o of pain on L side from shoulder to hip. EMS reports pt c/o R sided pain upon their arrival. Pt agitated and uncooperative for exam, but no obvious deformities, shortening, or rotation of extremities. Pt denies any CP, SOB, N/V, fever, chills, urinary complaints, constipation, diarrhea, HA, dizziness, weakness. Lungs CTA, +central pulses. Abdomen soft, not tender, not distended. Ambulating w/ steady gait, safety and comfort maintained, no acute distress noted at this time.

## 2018-12-24 NOTE — ED PROVIDER NOTE - PHYSICAL EXAMINATION
General: nad, AAOx0  HEENT: EOMI, PERRLA, normal mucosa, normal oropharynx, no lesions on the lips or on oral mucosa, normal external ear  Neck: supple, no lymphadenopathy, full range of motion, no nuchal rigidity  CV: RRR, normal S1 and S2 with no murmur, capillary refill less than two seconds  Resp: lungs CTA b/l, good aeration bilaterally, symmetric chest wall   Abd: non-distended, soft, non-tender, normoactive bowel sounds  : no CVA tenderness  MSK: full range of motion, no cyanosis, no edema, no clubbing, no immobility  Neuro: cranial nerves intact, muscle strength 5/5 in all extremities
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
## 2018-12-24 NOTE — ED ADULT NURSE NOTE - NSIMPLEMENTINTERV_GEN_ALL_ED
Implemented All Fall with Harm Risk Interventions:  [place] to call system. Call bell, personal items and telephone within reach. Instruct patient to call for assistance. Room bathroom lighting operational. Non-slip footwear when patient is off stretcher. Physically safe environment: no spills, clutter or unnecessary equipment. Stretcher in lowest position, wheels locked, appropriate side rails in place. Provide visual cue, wrist band, yellow gown, etc. Monitor gait and stability. Monitor for mental status changes and reorient to person, place, and time. Review medications for side effects contributing to fall risk. Reinforce activity limits and safety measures with patient and family. Provide visual clues: red socks.

## 2018-12-24 NOTE — ED ADULT NURSE NOTE - NURSING MUSC EXTREMITY NORMAL ROM
right upper extremity/left upper extremity/left lower extremity right upper extremity/right lower extremity/left lower extremity

## 2018-12-24 NOTE — ED ADULT NURSE REASSESSMENT NOTE - NS ED NURSE REASSESS COMMENT FT1
Straight cath performed as per MD order; 2 RNs at bedside during procedure; sterile technique utilized and maintained. sterile specimens collected and set to lab.

## 2018-12-24 NOTE — ED ADULT NURSE REASSESSMENT NOTE - NS ED NURSE REASSESS COMMENT FT1
Received report from MARISOL Muñoz at 0700. Pt A&Ox1 knows name - this is pt baseline h/o dementia, resting, VS stable, uncooperative. Safety checked. No c/o pain or discomfort at this time. Comfort care provided. Pt encouraged to call for assist when needed. pending CT

## 2018-12-24 NOTE — ED ADULT NURSE NOTE - PMH
Aortic stenosis    Cataracts, bilateral    Depression    Glaucoma    Heart failure    Hemophilia A    HTN (hypertension)    Hypercholesterolemia

## 2018-12-24 NOTE — ED PROVIDER NOTE - CARE PLAN
Principal Discharge DX:	Fall Principal Discharge DX:	Fall  Goal:	possible head trauma, musculoskeletal injury

## 2018-12-24 NOTE — ED PROVIDER NOTE - ATTENDING CONTRIBUTION TO CARE
GEN: no acute respiratory distress. nontoxic, speaking comfortably in full sentences, ambulating with steady gait.  HEENT: NCAT. face symmetrical. PERRL 4mm, EOMI, normal auditory canal b/l, normal TM b/l. no hemotympanum. nose midline and without discharge,  MMM, oropharynx wnl.  Neck: no JVD, trachea midline, no LAD  CV: RRR. +S1S2, no murmur. 2+ pulses in 4 extremities, cap refill <2 sec  Chest: CTA B/l. no wheezing, rales, rhonchi. no retractions. good air movement.  ABD: +BS, soft, non distended, non tender. No guarding/rebound.   : no cva or suprapubic tenderness  MSK: No clubbing, cyanosis, edema. decreased ROM left lower ext at hip and knee. normal movement of left foot and ankle/. no tenderness to palpation. No midline or paraspinal tenderness. no spinal step-offs.  Neuro: AOOx1.  Sensation intact, motor 5/5 throughout.       spoke with Svetlana at wellness at [facility]. report she was given was pt was found down on the floor in her room. fall unwitnessed. per Svetlana pt ambulatory with rolling walker at baseline. mental status as described is pt baseline. GEN: no acute respiratory distress. nontoxic, speaking comfortably in full sentences, ambulating with steady gait.  HEENT: NCAT. face symmetrical. PERRL 4mm, EOMI, normal auditory canal b/l, normal TM b/l. no hemotympanum. nose midline and without discharge,  MMM, oropharynx wnl.  Neck: no JVD, trachea midline, no LAD  CV: RRR. +S1S2, no murmur. 2+ pulses in 4 extremities, cap refill <2 sec  Chest: CTA B/l. no wheezing, rales, rhonchi. no retractions. good air movement.  ABD: +BS, soft, non distended, non tender. No guarding/rebound.   : no cva or suprapubic tenderness  MSK: No clubbing, cyanosis, edema. decreased ROM left lower ext at hip and knee. normal movement of left foot and ankle/. no tenderness to palpation. No midline or paraspinal tenderness. no spinal step-offs.  Neuro: AOOx1.  Sensation intact, motor 5/5 throughout.     spoke with Svetlana at wellness at [facility]. report she was given was pt was found down on the floor in her room. fall unwitnessed. per Svetlana pt ambulatory with rolling walker at baseline. mental status as described is pt baseline. patient denies any pain, but limited historian 2/2 dementia. plan labs, ct, xr, reassess. GEN: no acute respiratory distress. nontoxic, speaking comfortably in full sentences, ambulating with steady gait.  HEENT: NCAT. face symmetrical. PERRL 4mm, EOMI, normal auditory canal b/l, normal TM b/l. no hemotympanum. nose midline and without discharge,  MMM, oropharynx wnl.  Neck: no JVD, trachea midline, no LAD  CV: RRR. +S1S2, no murmur. 2+ pulses in 4 extremities, cap refill <2 sec  Chest: CTA B/l. no wheezing, rales, rhonchi. no retractions. good air movement.  ABD: +BS, soft, non distended, non tender. No guarding/rebound.   : no cva or suprapubic tenderness  MSK: No clubbing, cyanosis, edema. decreased ROM left lower ext at hip and knee. normal movement of left foot and ankle/. no tenderness to palpation. No midline or paraspinal tenderness. no spinal step-offs.  Neuro: AOOx1.  Sensation intact, motor 5/5 throughout.     spoke with Svetlana at wellness at [facility]. report she was given was pt was found down on the floor in her room. fall unwitnessed. per Svetlana pt ambulatory with rolling walker at baseline. mental status as described is pt baseline. patient denies any pain, but limited historian 2/2 dementia. plan labs, ct, xr, reassess. pt s/o to day team pending work up

## 2018-12-29 ENCOUNTER — TRANSCRIPTION ENCOUNTER (OUTPATIENT)
Age: 83
End: 2018-12-29

## 2018-12-30 ENCOUNTER — INPATIENT (INPATIENT)
Facility: HOSPITAL | Age: 83
LOS: 3 days | Discharge: NURSING, REHAB OR LONG-TERM CARE | DRG: 493 | End: 2019-01-03
Attending: ORTHOPAEDIC SURGERY | Admitting: ORTHOPAEDIC SURGERY
Payer: MEDICARE

## 2018-12-30 VITALS
HEART RATE: 76 BPM | OXYGEN SATURATION: 97 % | SYSTOLIC BLOOD PRESSURE: 146 MMHG | TEMPERATURE: 98 F | RESPIRATION RATE: 16 BRPM | DIASTOLIC BLOOD PRESSURE: 56 MMHG | WEIGHT: 130.07 LBS

## 2018-12-30 DIAGNOSIS — S82.899A OTHER FRACTURE OF UNSPECIFIED LOWER LEG, INITIAL ENCOUNTER FOR CLOSED FRACTURE: ICD-10-CM

## 2018-12-30 LAB
ANION GAP SERPL CALC-SCNC: 11 MMOL/L — SIGNIFICANT CHANGE UP (ref 5–17)
ANION GAP SERPL CALC-SCNC: 12 MMOL/L — SIGNIFICANT CHANGE UP (ref 5–17)
ANION GAP SERPL CALC-SCNC: 13 MMOL/L — SIGNIFICANT CHANGE UP (ref 5–17)
APPEARANCE UR: CLEAR — SIGNIFICANT CHANGE UP
APTT BLD: 150 SEC — CRITICAL HIGH (ref 27.5–36.3)
APTT BLD: 63.9 SEC — HIGH (ref 27.5–36.3)
BACTERIA # UR AUTO: NEGATIVE — SIGNIFICANT CHANGE UP
BASOPHILS # BLD AUTO: 0 K/UL — SIGNIFICANT CHANGE UP (ref 0–0.2)
BASOPHILS NFR BLD AUTO: 0.4 % — SIGNIFICANT CHANGE UP (ref 0–2)
BILIRUB UR-MCNC: NEGATIVE — SIGNIFICANT CHANGE UP
BLD GP AB SCN SERPL QL: NEGATIVE — SIGNIFICANT CHANGE UP
BUN SERPL-MCNC: 24 MG/DL — HIGH (ref 7–23)
BUN SERPL-MCNC: 30 MG/DL — HIGH (ref 7–23)
BUN SERPL-MCNC: 31 MG/DL — HIGH (ref 7–23)
CALCIUM SERPL-MCNC: 9.2 MG/DL — SIGNIFICANT CHANGE UP (ref 8.4–10.5)
CALCIUM SERPL-MCNC: 9.3 MG/DL — SIGNIFICANT CHANGE UP (ref 8.4–10.5)
CALCIUM SERPL-MCNC: 9.3 MG/DL — SIGNIFICANT CHANGE UP (ref 8.4–10.5)
CHLORIDE SERPL-SCNC: 104 MMOL/L — SIGNIFICANT CHANGE UP (ref 96–108)
CHLORIDE SERPL-SCNC: 104 MMOL/L — SIGNIFICANT CHANGE UP (ref 96–108)
CHLORIDE SERPL-SCNC: 105 MMOL/L — SIGNIFICANT CHANGE UP (ref 96–108)
CO2 SERPL-SCNC: 26 MMOL/L — SIGNIFICANT CHANGE UP (ref 22–31)
CO2 SERPL-SCNC: 27 MMOL/L — SIGNIFICANT CHANGE UP (ref 22–31)
CO2 SERPL-SCNC: 27 MMOL/L — SIGNIFICANT CHANGE UP (ref 22–31)
COLOR SPEC: YELLOW — SIGNIFICANT CHANGE UP
CREAT SERPL-MCNC: 0.87 MG/DL — SIGNIFICANT CHANGE UP (ref 0.5–1.3)
CREAT SERPL-MCNC: 0.97 MG/DL — SIGNIFICANT CHANGE UP (ref 0.5–1.3)
CREAT SERPL-MCNC: 1.01 MG/DL — SIGNIFICANT CHANGE UP (ref 0.5–1.3)
DIFF PNL FLD: NEGATIVE — SIGNIFICANT CHANGE UP
EOSINOPHIL # BLD AUTO: 0.2 K/UL — SIGNIFICANT CHANGE UP (ref 0–0.5)
EOSINOPHIL NFR BLD AUTO: 2.2 % — SIGNIFICANT CHANGE UP (ref 0–6)
EPI CELLS # UR: 3 /HPF — SIGNIFICANT CHANGE UP
GLUCOSE SERPL-MCNC: 121 MG/DL — HIGH (ref 70–99)
GLUCOSE SERPL-MCNC: 130 MG/DL — HIGH (ref 70–99)
GLUCOSE SERPL-MCNC: 138 MG/DL — HIGH (ref 70–99)
GLUCOSE UR QL: NEGATIVE — SIGNIFICANT CHANGE UP
HCT VFR BLD CALC: 26.6 % — LOW (ref 34.5–45)
HCT VFR BLD CALC: 29.7 % — LOW (ref 34.5–45)
HGB BLD-MCNC: 8.6 G/DL — LOW (ref 11.5–15.5)
HGB BLD-MCNC: 9.6 G/DL — LOW (ref 11.5–15.5)
HYALINE CASTS # UR AUTO: 1 /LPF — SIGNIFICANT CHANGE UP (ref 0–2)
INR BLD: 1.08 RATIO — SIGNIFICANT CHANGE UP (ref 0.88–1.16)
INR BLD: 1.1 RATIO — SIGNIFICANT CHANGE UP (ref 0.88–1.16)
KETONES UR-MCNC: NEGATIVE — SIGNIFICANT CHANGE UP
LEUKOCYTE ESTERASE UR-ACNC: NEGATIVE — SIGNIFICANT CHANGE UP
LYMPHOCYTES # BLD AUTO: 1.6 K/UL — SIGNIFICANT CHANGE UP (ref 1–3.3)
LYMPHOCYTES # BLD AUTO: 18 % — SIGNIFICANT CHANGE UP (ref 13–44)
MCHC RBC-ENTMCNC: 28.6 PG — SIGNIFICANT CHANGE UP (ref 27–34)
MCHC RBC-ENTMCNC: 28.9 PG — SIGNIFICANT CHANGE UP (ref 27–34)
MCHC RBC-ENTMCNC: 32.3 GM/DL — SIGNIFICANT CHANGE UP (ref 32–36)
MCHC RBC-ENTMCNC: 32.6 GM/DL — SIGNIFICANT CHANGE UP (ref 32–36)
MCV RBC AUTO: 88.5 FL — SIGNIFICANT CHANGE UP (ref 80–100)
MCV RBC AUTO: 88.7 FL — SIGNIFICANT CHANGE UP (ref 80–100)
MONOCYTES # BLD AUTO: 0.9 K/UL — SIGNIFICANT CHANGE UP (ref 0–0.9)
MONOCYTES NFR BLD AUTO: 10 % — SIGNIFICANT CHANGE UP (ref 2–14)
NEUTROPHILS # BLD AUTO: 6.2 K/UL — SIGNIFICANT CHANGE UP (ref 1.8–7.4)
NEUTROPHILS NFR BLD AUTO: 69.3 % — SIGNIFICANT CHANGE UP (ref 43–77)
NITRITE UR-MCNC: NEGATIVE — SIGNIFICANT CHANGE UP
PH UR: 6 — SIGNIFICANT CHANGE UP (ref 5–8)
PLATELET # BLD AUTO: 288 K/UL — SIGNIFICANT CHANGE UP (ref 150–400)
PLATELET # BLD AUTO: 300 K/UL — SIGNIFICANT CHANGE UP (ref 150–400)
POTASSIUM SERPL-MCNC: 4.6 MMOL/L — SIGNIFICANT CHANGE UP (ref 3.5–5.3)
POTASSIUM SERPL-MCNC: 4.6 MMOL/L — SIGNIFICANT CHANGE UP (ref 3.5–5.3)
POTASSIUM SERPL-MCNC: 5.4 MMOL/L — HIGH (ref 3.5–5.3)
POTASSIUM SERPL-SCNC: 4.6 MMOL/L — SIGNIFICANT CHANGE UP (ref 3.5–5.3)
POTASSIUM SERPL-SCNC: 4.6 MMOL/L — SIGNIFICANT CHANGE UP (ref 3.5–5.3)
POTASSIUM SERPL-SCNC: 5.4 MMOL/L — HIGH (ref 3.5–5.3)
PROT UR-MCNC: SIGNIFICANT CHANGE UP
PROTHROM AB SERPL-ACNC: 12.4 SEC — SIGNIFICANT CHANGE UP (ref 10–12.9)
PROTHROM AB SERPL-ACNC: 12.6 SEC — SIGNIFICANT CHANGE UP (ref 10–12.9)
RBC # BLD: 2.99 M/UL — LOW (ref 3.8–5.2)
RBC # BLD: 3.35 M/UL — LOW (ref 3.8–5.2)
RBC # FLD: 14.7 % — HIGH (ref 10.3–14.5)
RBC # FLD: 15.1 % — HIGH (ref 10.3–14.5)
RBC CASTS # UR COMP ASSIST: 1 /HPF — SIGNIFICANT CHANGE UP (ref 0–4)
RH IG SCN BLD-IMP: POSITIVE — SIGNIFICANT CHANGE UP
RH IG SCN BLD-IMP: POSITIVE — SIGNIFICANT CHANGE UP
SODIUM SERPL-SCNC: 142 MMOL/L — SIGNIFICANT CHANGE UP (ref 135–145)
SODIUM SERPL-SCNC: 143 MMOL/L — SIGNIFICANT CHANGE UP (ref 135–145)
SODIUM SERPL-SCNC: 144 MMOL/L — SIGNIFICANT CHANGE UP (ref 135–145)
SP GR SPEC: 1.02 — SIGNIFICANT CHANGE UP (ref 1.01–1.02)
UROBILINOGEN FLD QL: NEGATIVE — SIGNIFICANT CHANGE UP
WBC # BLD: 9 K/UL — SIGNIFICANT CHANGE UP (ref 3.8–10.5)
WBC # BLD: 9.4 K/UL — SIGNIFICANT CHANGE UP (ref 3.8–10.5)
WBC # FLD AUTO: 9 K/UL — SIGNIFICANT CHANGE UP (ref 3.8–10.5)
WBC # FLD AUTO: 9.4 K/UL — SIGNIFICANT CHANGE UP (ref 3.8–10.5)
WBC UR QL: 1 /HPF — SIGNIFICANT CHANGE UP (ref 0–5)

## 2018-12-30 PROCEDURE — 13121 CMPLX RPR S/A/L 2.6-7.5 CM: CPT | Mod: 59,LT

## 2018-12-30 PROCEDURE — 93010 ELECTROCARDIOGRAM REPORT: CPT

## 2018-12-30 PROCEDURE — 72125 CT NECK SPINE W/O DYE: CPT | Mod: 26

## 2018-12-30 PROCEDURE — 73610 X-RAY EXAM OF ANKLE: CPT | Mod: 26,LT

## 2018-12-30 PROCEDURE — 13122 CMPLX RPR S/A/L ADDL 5 CM/>: CPT | Mod: 59,LT

## 2018-12-30 PROCEDURE — 73590 X-RAY EXAM OF LOWER LEG: CPT | Mod: 26,LT

## 2018-12-30 PROCEDURE — 73552 X-RAY EXAM OF FEMUR 2/>: CPT | Mod: 26,LT

## 2018-12-30 PROCEDURE — 73502 X-RAY EXAM HIP UNI 2-3 VIEWS: CPT | Mod: 26,LT

## 2018-12-30 PROCEDURE — 70450 CT HEAD/BRAIN W/O DYE: CPT | Mod: 26

## 2018-12-30 PROCEDURE — 99223 1ST HOSP IP/OBS HIGH 75: CPT

## 2018-12-30 PROCEDURE — 11012 DEB SKIN BONE AT FX SITE: CPT | Mod: 59,LT

## 2018-12-30 PROCEDURE — 71045 X-RAY EXAM CHEST 1 VIEW: CPT | Mod: 26

## 2018-12-30 PROCEDURE — 27814 TREATMENT OF ANKLE FRACTURE: CPT | Mod: LT

## 2018-12-30 PROCEDURE — 99285 EMERGENCY DEPT VISIT HI MDM: CPT | Mod: GC,25

## 2018-12-30 RX ORDER — TETANUS TOXOID, REDUCED DIPHTHERIA TOXOID AND ACELLULAR PERTUSSIS VACCINE, ADSORBED 5; 2.5; 8; 8; 2.5 [IU]/.5ML; [IU]/.5ML; UG/.5ML; UG/.5ML; UG/.5ML
0.5 SUSPENSION INTRAMUSCULAR ONCE
Qty: 0 | Refills: 0 | Status: COMPLETED | OUTPATIENT
Start: 2018-12-30 | End: 2018-12-30

## 2018-12-30 RX ORDER — ACETAMINOPHEN 500 MG
975 TABLET ORAL EVERY 8 HOURS
Qty: 0 | Refills: 0 | Status: DISCONTINUED | OUTPATIENT
Start: 2018-12-30 | End: 2019-01-03

## 2018-12-30 RX ORDER — OXYCODONE HYDROCHLORIDE 5 MG/1
2.5 TABLET ORAL EVERY 4 HOURS
Qty: 0 | Refills: 0 | Status: DISCONTINUED | OUTPATIENT
Start: 2018-12-30 | End: 2018-12-30

## 2018-12-30 RX ORDER — CEFAZOLIN SODIUM 1 G
2000 VIAL (EA) INJECTION ONCE
Qty: 0 | Refills: 0 | Status: COMPLETED | OUTPATIENT
Start: 2018-12-30 | End: 2018-12-30

## 2018-12-30 RX ORDER — PROPRANOLOL HCL 160 MG
20 CAPSULE, EXTENDED RELEASE 24HR ORAL EVERY 8 HOURS
Qty: 0 | Refills: 0 | Status: DISCONTINUED | OUTPATIENT
Start: 2018-12-30 | End: 2019-01-03

## 2018-12-30 RX ORDER — OXYCODONE HYDROCHLORIDE 5 MG/1
5 TABLET ORAL EVERY 4 HOURS
Qty: 0 | Refills: 0 | Status: DISCONTINUED | OUTPATIENT
Start: 2018-12-30 | End: 2018-12-30

## 2018-12-30 RX ORDER — FENTANYL CITRATE 50 UG/ML
25 INJECTION INTRAVENOUS
Qty: 0 | Refills: 0 | Status: DISCONTINUED | OUTPATIENT
Start: 2018-12-30 | End: 2018-12-30

## 2018-12-30 RX ORDER — QUETIAPINE FUMARATE 200 MG/1
25 TABLET, FILM COATED ORAL AT BEDTIME
Qty: 0 | Refills: 0 | Status: DISCONTINUED | OUTPATIENT
Start: 2018-12-30 | End: 2019-01-03

## 2018-12-30 RX ORDER — SODIUM CHLORIDE 9 MG/ML
1000 INJECTION INTRAMUSCULAR; INTRAVENOUS; SUBCUTANEOUS
Qty: 0 | Refills: 0 | Status: DISCONTINUED | OUTPATIENT
Start: 2018-12-30 | End: 2019-01-03

## 2018-12-30 RX ORDER — SODIUM CHLORIDE 9 MG/ML
1000 INJECTION INTRAMUSCULAR; INTRAVENOUS; SUBCUTANEOUS
Qty: 0 | Refills: 0 | Status: DISCONTINUED | OUTPATIENT
Start: 2018-12-30 | End: 2018-12-30

## 2018-12-30 RX ORDER — MORPHINE SULFATE 50 MG/1
2 CAPSULE, EXTENDED RELEASE ORAL EVERY 4 HOURS
Qty: 0 | Refills: 0 | Status: DISCONTINUED | OUTPATIENT
Start: 2018-12-30 | End: 2018-12-30

## 2018-12-30 RX ORDER — OMEPRAZOLE 10 MG/1
1 CAPSULE, DELAYED RELEASE ORAL
Qty: 0 | Refills: 0 | COMMUNITY

## 2018-12-30 RX ORDER — TRAMADOL HYDROCHLORIDE 50 MG/1
25 TABLET ORAL EVERY 4 HOURS
Qty: 0 | Refills: 0 | Status: DISCONTINUED | OUTPATIENT
Start: 2018-12-30 | End: 2019-01-03

## 2018-12-30 RX ORDER — LATANOPROST 0.05 MG/ML
1 SOLUTION/ DROPS OPHTHALMIC; TOPICAL AT BEDTIME
Qty: 0 | Refills: 0 | Status: DISCONTINUED | OUTPATIENT
Start: 2018-12-30 | End: 2019-01-03

## 2018-12-30 RX ORDER — ATORVASTATIN CALCIUM 80 MG/1
40 TABLET, FILM COATED ORAL AT BEDTIME
Qty: 0 | Refills: 0 | Status: DISCONTINUED | OUTPATIENT
Start: 2018-12-30 | End: 2018-12-30

## 2018-12-30 RX ORDER — PANTOPRAZOLE SODIUM 20 MG/1
40 TABLET, DELAYED RELEASE ORAL
Qty: 0 | Refills: 0 | Status: DISCONTINUED | OUTPATIENT
Start: 2018-12-30 | End: 2019-01-03

## 2018-12-30 RX ORDER — ENOXAPARIN SODIUM 100 MG/ML
40 INJECTION SUBCUTANEOUS EVERY 24 HOURS
Qty: 0 | Refills: 0 | Status: DISCONTINUED | OUTPATIENT
Start: 2018-12-30 | End: 2018-12-30

## 2018-12-30 RX ORDER — PROPRANOLOL HCL 160 MG
20 CAPSULE, EXTENDED RELEASE 24HR ORAL EVERY 8 HOURS
Qty: 0 | Refills: 0 | Status: DISCONTINUED | OUTPATIENT
Start: 2018-12-30 | End: 2018-12-30

## 2018-12-30 RX ORDER — ATORVASTATIN CALCIUM 80 MG/1
40 TABLET, FILM COATED ORAL AT BEDTIME
Qty: 0 | Refills: 0 | Status: DISCONTINUED | OUTPATIENT
Start: 2018-12-30 | End: 2019-01-03

## 2018-12-30 RX ORDER — PANTOPRAZOLE SODIUM 20 MG/1
40 TABLET, DELAYED RELEASE ORAL
Qty: 0 | Refills: 0 | Status: DISCONTINUED | OUTPATIENT
Start: 2018-12-30 | End: 2018-12-30

## 2018-12-30 RX ORDER — QUETIAPINE FUMARATE 200 MG/1
25 TABLET, FILM COATED ORAL AT BEDTIME
Qty: 0 | Refills: 0 | Status: DISCONTINUED | OUTPATIENT
Start: 2018-12-30 | End: 2018-12-30

## 2018-12-30 RX ORDER — LATANOPROST 0.05 MG/ML
1 SOLUTION/ DROPS OPHTHALMIC; TOPICAL AT BEDTIME
Qty: 0 | Refills: 0 | Status: DISCONTINUED | OUTPATIENT
Start: 2018-12-30 | End: 2018-12-30

## 2018-12-30 RX ORDER — CEFAZOLIN SODIUM 1 G
2000 VIAL (EA) INJECTION EVERY 8 HOURS
Qty: 0 | Refills: 0 | Status: COMPLETED | OUTPATIENT
Start: 2018-12-30 | End: 2018-12-31

## 2018-12-30 RX ORDER — PROPRANOLOL HCL 160 MG
0 CAPSULE, EXTENDED RELEASE 24HR ORAL
Qty: 0 | Refills: 0 | COMMUNITY

## 2018-12-30 RX ORDER — CEFAZOLIN SODIUM 1 G
1000 VIAL (EA) INJECTION ONCE
Qty: 0 | Refills: 0 | Status: DISCONTINUED | OUTPATIENT
Start: 2018-12-30 | End: 2018-12-30

## 2018-12-30 RX ORDER — VANCOMYCIN HCL 1 G
1000 VIAL (EA) INTRAVENOUS DAILY
Qty: 0 | Refills: 0 | Status: DISCONTINUED | OUTPATIENT
Start: 2018-12-30 | End: 2018-12-31

## 2018-12-30 RX ADMIN — Medication 20 MILLIGRAM(S): at 23:14

## 2018-12-30 RX ADMIN — Medication 20 MILLIGRAM(S): at 10:37

## 2018-12-30 RX ADMIN — Medication 975 MILLIGRAM(S): at 23:10

## 2018-12-30 RX ADMIN — ATORVASTATIN CALCIUM 40 MILLIGRAM(S): 80 TABLET, FILM COATED ORAL at 23:07

## 2018-12-30 RX ADMIN — Medication 100 MILLIGRAM(S): at 03:05

## 2018-12-30 RX ADMIN — SODIUM CHLORIDE 50 MILLILITER(S): 9 INJECTION INTRAMUSCULAR; INTRAVENOUS; SUBCUTANEOUS at 20:48

## 2018-12-30 RX ADMIN — Medication 250 MILLIGRAM(S): at 23:14

## 2018-12-30 RX ADMIN — QUETIAPINE FUMARATE 25 MILLIGRAM(S): 200 TABLET, FILM COATED ORAL at 23:07

## 2018-12-30 RX ADMIN — SODIUM CHLORIDE 75 MILLILITER(S): 9 INJECTION INTRAMUSCULAR; INTRAVENOUS; SUBCUTANEOUS at 06:43

## 2018-12-30 RX ADMIN — Medication 100 MILLIGRAM(S): at 20:48

## 2018-12-30 RX ADMIN — TETANUS TOXOID, REDUCED DIPHTHERIA TOXOID AND ACELLULAR PERTUSSIS VACCINE, ADSORBED 0.5 MILLILITER(S): 5; 2.5; 8; 8; 2.5 SUSPENSION INTRAMUSCULAR at 04:06

## 2018-12-30 NOTE — PATIENT PROFILE ADULT - FALL HARM RISK
coagulation(Bleeding disorder R/T clinical cond/anti-coags)/age(85 years old or older)/other/bones(Osteoporosis,prev fx,steroid use,metastatic bone ca)

## 2018-12-30 NOTE — ED ADULT NURSE REASSESSMENT NOTE - NS ED NURSE REASSESS COMMENT FT1
pt having left ankle reduced by md diaz in ED at this time. patient tolerating well. will continue to monitor post reduction.

## 2018-12-30 NOTE — ED PROVIDER NOTE - MEDICAL DECISION MAKING DETAILS
open left ankle fracture, unknown mechanism of fall. Plan for xrays, adacel, antibiotics. WIll also scan head/c-spine due to unknown mechanism

## 2018-12-30 NOTE — ED PROVIDER NOTE - LOWER EXTREMITY EXAM, LEFT
obvious left ankle deformity with open wound to over the medial malleolus, significant ecchymosis to the lateral ankle and foot, palpable DP and PT pulses. Age indeterminate ecchymosis to the anterior left thigh, left leg internally rotated

## 2018-12-30 NOTE — ED PROVIDER NOTE - ATTENDING CONTRIBUTION TO CARE
89 yof hx mod dementia found on floor of nursing home, unk mech, pt unable to describe events or given signif hx. unk loc. signif left ankle deformity splinted by ems.     ros: unobtainable due to dementia    Physical Exam:   constitutional - , awake and alert, oriented x0  head - no external evidence of trauma  cvs - rrr, no murmurs, no peripheral edema  resp - breath sounds clear and equal bilat  gi - abdomen soft and nontender, no rigidity, guarding or rebound, bowel sounds present  msk - moving all extremities spontaneously w exception of left distal tib/fib obvious deformity w swelling; dp pulses are intact   neuro - alert and oriented x0, no focal deficits, CNs 2-12 grossly intact  skin- no jaundice, warm and dry  psych - diff to assess due to dementia    will scan head as unk [facility]; admit to ortho for operative repair. HARLAN Christopher MD

## 2018-12-30 NOTE — ED ADULT NURSE REASSESSMENT NOTE - NS ED NURSE REASSESS COMMENT FT1
Report called to [address]. Per [address] [place] [address], there is already a patient is [address] and [address]. ED Charge MARISOL Dietz aware and to call logistics at this time. Will followup.

## 2018-12-30 NOTE — CONSULT NOTE ADULT - ATTENDING COMMENTS
Pt is a poor historian. History has been obtained from family members. 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 She tolerated that surgery. Elevated PTT corroborates this history. Currently she is planned for ankle washout but potentially may be more invasive. There are no prior labs indicating her baseline Factor VI level. Her baseline may already be at or above 30%. If surgery is planned today without any baseline level, we would recommend 2 units of FFP. Given her aortic stenosis and concern of fluid overload, we would monitor fluid status. If any bleeding out of proportion to what is expected from procedure, further FFP can be obtained. PTT can be followed daily for evaluation of factor improvement; half life of XI is long so would expect to remain improved for 2 days. We will make further recommendations once the baseline Factor XI level returns tomorrow.

## 2018-12-30 NOTE — BRIEF OPERATIVE NOTE - OPERATION/FINDINGS
Irrigation and debridement of grade II open bimalleolar ankle fracture with open reduction internal fixation

## 2018-12-30 NOTE — CONSULT NOTE ADULT - SUBJECTIVE AND OBJECTIVE BOX
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  Plan for washout and possible fixation today by ortho.  Hematology consulted for hx of factor 11 deficiency.     Unable to obtain history for patient as demented, history obtained from chart and from family.  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  Does not follow with hematology.  Has only required any kind of factor replacement 15 years ago for total knee replacement, however did not have any bleeding events at that time.  Since then has gone for many dental procedures without requiring any agents to stop bleeding/bleeding events.  Family has not noted any bleeding for past 15 years.      Plan is for OR today around 11 AM.      PAST MEDICAL & SURGICAL HISTORY:  Aortic stenosis  Heart failure  Hemophilia A  Hypercholesterolemia  HTN (hypertension)  Depression  Cataracts, bilateral  Glaucoma  History of cataract extraction, left    MEDICATIONS  (STANDING):  atorvastatin 40 milliGRAM(s) Oral at bedtime  latanoprost 0.005% Ophthalmic Solution 1 Drop(s) Both EYES at bedtime  pantoprazole    Tablet 40 milliGRAM(s) Oral before breakfast  propranolol 20 milliGRAM(s) Oral every 8 hours  QUEtiapine 25 milliGRAM(s) Oral at bedtime  sodium chloride 0.9%. 1000 milliLiter(s) (75 mL/Hr) IV Continuous <Continuous>    MEDICATIONS  (PRN):      Allergies    aspirin (Unknown)  sulfa drugs (Unknown)    Intolerances        SOCIAL HISTORY:    FAMILY HISTORY:  No pertinent family history in first degree relatives      Vital Signs Last 24 Hrs  T(C): 36.8 (30 Dec 2018 07:48), Max: 36.8 (30 Dec 2018 07:48)  T(F): 98.3 (30 Dec 2018 07:48), Max: 98.3 (30 Dec 2018 07:48)  HR: 83 (30 Dec 2018 07:48) (76 - 83)  BP: 153/83 (30 Dec 2018 07:48) (112/99 - 153/83)  BP(mean): --  RR: 18 (30 Dec 2018 07:48) (16 - 18)  SpO2: 96% (30 Dec 2018 07:48) (95% - 97%)    PHYSICAL EXAM:    GENERAL: NAD  HEAD:  NC/AT  EYES: EOMI, PERRLA, no scleral icterus  HEENT: Moist mucous membranes  LUNG: Clear to auscultation bilaterally; No rales, rhonchi, wheezing, or rubs  HEART: RRR; No murmurs, rubs, or gallops  ABDOMEN: +BS, ST/ND/NT  EXTREMITIES: + bruising left lower extremity    LABS:                        9.6    9.0   )-----------( 300      ( 30 Dec 2018 04:13 )             29.7     12    142  |  104  |  30<H>  ----------------------------<  121<H>  4.6   |  27  |  0.97    Ca    9.2      30 Dec 2018 06:06      PT/INR - ( 30 Dec 2018 04:13 )   PT: 12.6 sec;   INR: 1.10 ratio         PTT - ( 30 Dec 2018 04:13 )  PTT:150.0 sec  Urinalysis Basic - ( 30 Dec 2018 06:36 )    Color: Yellow / Appearance: Clear / S.024 / pH: x  Gluc: x / Ketone: Negative  / Bili: Negative / Urobili: Negative   Blood: x / Protein: Trace / Nitrite: Negative   Leuk Esterase: Negative / RBC: 1 /hpf / WBC 1 /hpf   Sq Epi: x / Non Sq Epi: 3 /hpf / Bacteria: Negative            RADIOLOGY & ADDITIONAL STUDIES: HPI:  89y F hx of AS, HF with unknown EF, 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  Plan for washout and possible fixation today by ortho.  Hematology consulted for hx of factor 11 deficiency.     Unable to obtain history for patient as demented, history obtained from chart and from family.  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  Does not follow with hematology.  Has only required any kind of factor replacement 15 years ago for total knee replacement, however did not have any bleeding events at that time.  Since then has gone for many dental procedures without requiring any agents to stop bleeding/bleeding events.  Family has not noted any bleeding for past 15 years.      Plan is for OR today around 11 AM.      PAST MEDICAL & SURGICAL HISTORY:  Aortic stenosis  Heart failure  Hemophilia A  Hypercholesterolemia  HTN (hypertension)  Depression  Cataracts, bilateral  Glaucoma  History of cataract extraction, left    MEDICATIONS  (STANDING):  atorvastatin 40 milliGRAM(s) Oral at bedtime  latanoprost 0.005% Ophthalmic Solution 1 Drop(s) Both EYES at bedtime  pantoprazole    Tablet 40 milliGRAM(s) Oral before breakfast  propranolol 20 milliGRAM(s) Oral every 8 hours  QUEtiapine 25 milliGRAM(s) Oral at bedtime  sodium chloride 0.9%. 1000 milliLiter(s) (75 mL/Hr) IV Continuous <Continuous>    MEDICATIONS  (PRN):      Allergies    aspirin (Unknown)  sulfa drugs (Unknown)    Intolerances        SOCIAL HISTORY:    FAMILY HISTORY:  No pertinent family history in first degree relatives      Vital Signs Last 24 Hrs  T(C): 36.8 (30 Dec 2018 07:48), Max: 36.8 (30 Dec 2018 07:48)  T(F): 98.3 (30 Dec 2018 07:48), Max: 98.3 (30 Dec 2018 07:48)  HR: 83 (30 Dec 2018 07:48) (76 - 83)  BP: 153/83 (30 Dec 2018 07:48) (112/99 - 153/83)  BP(mean): --  RR: 18 (30 Dec 2018 07:48) (16 - 18)  SpO2: 96% (30 Dec 2018 07:48) (95% - 97%)    PHYSICAL EXAM:    GENERAL: NAD  HEAD:  NC/AT  EYES: EOMI, PERRLA, no scleral icterus  HEENT: Moist mucous membranes  LUNG: Clear to auscultation bilaterally; No rales, rhonchi, wheezing, or rubs  HEART: RRR; + MARLENI RSB  ABDOMEN: +BS, ST/ND/NT  EXTREMITIES: + LLE in bandage    LABS:                        9.6    9.0   )-----------( 300      ( 30 Dec 2018 04:13 )             29.7     12    142  |  104  |  30<H>  ----------------------------<  121<H>  4.6   |  27  |  0.97    Ca    9.2      30 Dec 2018 06:06      PT/INR - ( 30 Dec 2018 04:13 )   PT: 12.6 sec;   INR: 1.10 ratio         PTT - ( 30 Dec 2018 04:13 )  PTT:150.0 sec  Urinalysis Basic - ( 30 Dec 2018 06:36 )    Color: Yellow / Appearance: Clear / S.024 / pH: x  Gluc: x / Ketone: Negative  / Bili: Negative / Urobili: Negative   Blood: x / Protein: Trace / Nitrite: Negative   Leuk Esterase: Negative / RBC: 1 /hpf / WBC 1 /hpf   Sq Epi: x / Non Sq Epi: 3 /hpf / Bacteria: Negative            RADIOLOGY & ADDITIONAL STUDIES:

## 2018-12-30 NOTE — PRE-ANESTHESIA EVALUATION ADULT - NSANTHOSAYNRD_GEN_A_CORE
No. NIKI screening performed.  STOP BANG Legend: 0-2 = LOW Risk; 3-4 = INTERMEDIATE Risk; 5-8 = HIGH Risk

## 2018-12-30 NOTE — CHART NOTE - NSCHARTNOTEFT_GEN_A_CORE
Attempted calling numbers in medical record for consent. Family members including spouse are not answering phone. Unable to obtain consent at this time. OR today for frade 2 open ankle fracture.

## 2018-12-30 NOTE — H&P ADULT - NSHPLABSRESULTS_GEN_ALL_CORE
9.6    9.0   )-----------( 300      ( 30 Dec 2018 04:13 )             29.7       12-30    143  |  104  |  31<H>  ----------------------------<  130<H>  5.4<H>   |  26  |  1.01    Ca    9.3      30 Dec 2018 04:13                    PT/INR - ( 30 Dec 2018 04:13 )   PT: 12.6 sec;   INR: 1.10 ratio         PTT - ( 30 Dec 2018 04:13 )  PTT:150.0 sec    Lactate Trend            CAPILLARY BLOOD GLUCOSE            Culture Results:   >100,000 CFU/ml Enterococcus faecium (12-24 @ 13:52)      Xrays demonstrate left ankle bimalleolar fracture/dislocation

## 2018-12-30 NOTE — ED ADULT NURSE REASSESSMENT NOTE - NS ED NURSE REASSESS COMMENT FT1
patient n xray giving techs difficulty for taking images. kiki wick saw patient in xray, patient denies pain or discomfort. encouraged by rn to finish xrays. patient is compliant at this time. will continue to monitor, draw labs, and medicate at return.

## 2018-12-30 NOTE — BRIEF OPERATIVE NOTE - PROCEDURE
<<-----Click on this checkbox to enter Procedure Open reduction and internal fixation (ORIF) of fracture of left ankle  12/30/2018    Active  VICENTE

## 2018-12-30 NOTE — ED ADULT NURSE REASSESSMENT NOTE - NS ED NURSE REASSESS COMMENT FT1
Patient sleeping in bed comfortably at this time. In NAD. Safety and comfort maintained. A&O x1 which is baseline mental status in ED. Admitted and awaiting bed assignment. Safety and comfort maintained. Will continue to monitor.

## 2018-12-30 NOTE — H&P ADULT - NSHPPHYSICALEXAM_GEN_ALL_CORE
Physical Exam  Gen: Nad  L LE: +ecchymosis diffuse over left proximal femur and left lower leg, +2-3 cm open wound over medial malleolus, +TTP medial/lateral malleolus, no bony ttp elsewhere, +ehl/fhl/ta/gs function, L3-S1 SILT, dp/pt pulse intact, negative log roll, able to SLR, compartments soft/compressive, extremity warm/well perfused    Secondary Survey: No TTP over bony prominences, SILT, palpable pulses, full/painless range of motion, compartments soft     Procedure: 10 cc 1% lidocaine injected under sterile procedure into L ankle joint. Closed reduction performed. Placed in well padded trilam splint. Post procedure imaging obtained. Post procedure exam unchanged, NV intact, able to move all toes, SILT distally.

## 2018-12-30 NOTE — ED ADULT NURSE REASSESSMENT NOTE - NS ED NURSE REASSESS COMMENT FT1
pt received 0230 from kiki felix, pt is off unit at CT and xray for >30 min at this time, will administer ancef and draw labs at return

## 2018-12-30 NOTE — H&P ADULT - HISTORY OF PRESENT ILLNESS
89y Female nursing home resident ambulatory with walker presents c/o L ankle pain sp unwitnessed fall/found down at nursing home.  Unknown HS/LOC. Denies numbness/tingling. No other pain/injuries. Denies fevers/chills.

## 2018-12-30 NOTE — ED ADULT NURSE NOTE - OBJECTIVE STATEMENT
89 y/o F BIBA from [facility]. Patient found on ground in her bathroom. Patient does not remember falling. Patient is alert to person, knows she is in the hospital, unsure of which one, and unsure of her birth year (knew Month and Day). Concern for left ankle fracture.

## 2018-12-30 NOTE — CONSULT NOTE ADULT - SUBJECTIVE AND OBJECTIVE BOX
The patient was seen and examined today after an accidental fall with a displaced and comminuted left ankle fracture that requires orthopedic ORIF. Her comorbidities include controlled hypertension heart murmurs of AS/MI with no cardiac symptoms documented arrythmia or history of CHF, diffuse osteoarthritis with kyphoscoliosis, borderline  senile dementia and advanced age. Exam, lab, EKG and CXR are stable. The patient is medically stable, medically optimized and has no medical contraindication to surgery today as required. Exam time 70 minutes including > than 50 % for bedside discussion and counseling with the patient and her daughter. Comprehensive consultation dictated #[id number]. The patient was seen and examined today after an accidental fall with a displaced and comminuted left ankle fracture that requires orthopedic ORIF. Her comorbidities include controlled hypertension heart murmurs of AS/MI with no cardiac symptoms documented arrythmia or history of CHF, diffuse osteoarthritis with kyphoscoliosis, borderline  senile dementia and advanced age. Exam, lab, EKG and CXR are stable. The patient is medically stable, medically optimized and has no medical contraindication to surgery today as required. Exam time 70 minutes including > than 50 % for bedside discussion and counseling with the patient and her daughter. Comprehensive consultation dictated #[id number].      CONSULTING SERVICE:  Medicine.    REASON FOR CONSULTATION:  The patient admitted on 12/30/2018 with a left ankle fracture.    HISTORY OF PRESENT ILLNESS:  The patient is a 89-year-old female who was in an adult care facility, the [facility] and able to walk and independently take her meals for the past five years since the death of her .  She has had increasing accidental falls because of left-sided osteoarthritis and has had trauma repeatedly over the past several months.  She fell to the ground and was sent to the Emergency Room at [facility] on 12/24/2018 with a bruise of her left hip, no fracture and returned to the [facility], while there for the next six days, she was really not ambulatory and had increased swelling in her left ankle and returned to the Emergency Room at [facility].  X-rays confirmed an acute left ankle fracture.  There was distal fibula comminuted fracture with posterior displacement and an acute comminuted fracture of the medial malleolus noted.  It was determined that the patient would require ORIF for stabilization of the ankle as that would never be any wound healing given the nature and location of the present fracture.    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  She ambulates with severe kyphoscoliosis and in addition, she has osteoarthritis of the hands, shoulders, knees and hips.  However, she was able to manage in independent lifestyle with adequate mentation and ambulation in order to be independent until this fall and fracture.    MEDICATIONS:  At the present time, include Lipitor, she takes 60 mg daily, metoprolol succinate 100 mg daily converted to propranolol 20 mg every 8 hours, Macrobid 100 mg twice a day for chronic urinary suppression.  Latanoprost ophthalmic 1 drop to right eye at bedtime, Combigan solution 1 drop to the right eye every 12 hours, Lexapro 10 mg once a day, vitamin D 2000 IU once a day and she takes Seroquel 25 mg at bedtime daily.    ALLERGIES:  SHE HAS ALLERGIES TO SULFA THAT ARE CONFIRMED.  SHE HAS BEEN TOLD NOT TO TAKE ASPIRIN OR NONSTEROIDAL ANTI-INFLAMMATORY AGENTS FOR HER ARTHRITIS BECAUSE OF HER FACTOR XI DEFICIENCY.    PAST SURGICAL HISTORY:  Includes, she has vaginal delivery x2.  She has a distant hysterectomy with BSO.  She had a left total knee replacement in 1998 she had a breast carcinoma in situ removed in 2003 and she had bilateral cataract surgery in 2009.    PAST MEDICAL HISTORY:  Includes diffuse osteoarthritis, early senile dementia, severe kyphoscoliosis, factor XI deficiency and a known heart murmur without significant heart disease.  She also known to have hypertension and she has glaucoma and mild hypercholesterolemia and depression.    FAMILY HISTORY:  Significant only for her mother who had arteriosclerotic heart disease at the age of 77.    DIET:  The patient's diet is soft, because of bad teeth and she has a weight of 120 pounds.    SOCIAL HISTORY:  She is a nonsmoker, nondrinker with no drug history.  She lives at the [facility], but independent.  She is a retired  and has two living children.    REVIEW OF SYSTEMS:  She has no headaches or dizziness.  She has positive for loss of hearing.  She has glaucoma especially in the right eye.  She is significantly hard of hearing with deafness.  She has just undergone significant dental disease with loss of teeth.  She has no difficulty swallowing.  Her breathing has been good with no shortness of breath, cough, congestion or wheezing.  She has a history of hypertension, but no cardiac symptoms of chest pain, palpitations, arrhythmia or orthopnea or exertional shortness of breath or peripheral edema.  She has occasional GERD.      One year ago, she had an upper GI bleed and was treated clinically with sucralfate and Protonix with no workup and the bleeding has stopped spontaneously.  She has no change in bowel habits.  She has no dysuria, frequency.  She does have incontinence and she takes chronic Macrobid for recurrent UTIs.  She has no diabetes, thyroid disease and likely does have osteoporosis.  She has no rashes or skin problems.  She has severe osteoarthritis with decreased ability to walk and essentially wheelchair bound.  She is essentially compromised in walking, but not wheelchair-bound.  She has no neurological strokes or seizures, but slowly progressive dementia with forgetfulness and altered recent memory.    PHYSICAL EXAMINATION:  At this time shows VITAL SIGNS:  Blood pressure 150/80, heart rate of 80, respirations are 16.  She is afebrile.  HEAD and NECK:  Examination is unremarkable.  CHEST:  Clear with good breath sounds.  CARDIAC:  Examination, she has a 4/6 holosystolic murmur localized to left lower sternal border radiating to the axilla.  She has a 2/6 systolic ejection murmur at the right upper sternal border radiating to the neck.  ABDOMEN:  Soft with no masses, tenderness, guarding or rebound.  EXTREMITIES:  Her left ankle is immobilized and bandage as a result of the fracture.  Her right leg is normal.    RADIOLOGY DATA:  Chest x-ray performed shows rotation with kyphoscoliosis, but no focal infiltrates.  EKG shows left ventricular hypertrophy with nonspecific changes, unchanged from 12/24/2018 and normal sinus rhythm.    LABORATORY DATA:  Laboratories obtained, CBC, hemoglobin is 9.6, hematocrits 29.7, white count 9.0, platelet count is 300,000.  INR is 1.10.  PTT is 150 secondary to factor XI deficiency.  Sodium 143, potassium 5.4, repeated is 4.6, chloride 104, CO2 26, BUN is 31, creatinine is 1.01, blood sugar is 130.  Calcium is 9.3.  GFR is 47 mL/min.    IMPRESSION AND PLAN:  The impression at this time is that the patient has a displaced and comminuted left ankle fracture, most likely from recurring falls and complicated by osteoporosis.  She requires open reduction and internal fixation, stabilization in order to control her intractable pain and allow for healing to take place.  Without surgery, she will be a constant pain in the area and the joint will not heal.    This has been explained to the family, who chooses to proceed with surgery despite the high risk secondary to her advanced age and cardiac status and accept the risk of surgery versus the inevitability of a nonsurgical nonhealing ankle fracture.  The patient is medically optimized and although high-risk has no medical contraindications to surgery today as is required.  She will receive 2 units of FFP because of the elevated PTT and factor XI deficiency and Hematology consultation was obtained.        _______________________    DICT:	ARINA REDD MD ([id number]) 12/30/2018 10:15 AM  TRANS:	[id number] 12/30/2018 10:21 AM  JOB:	[id number]     Electronically Signed by:  ARINA REDD 12/30/2018 07:51:40 PM

## 2018-12-30 NOTE — CONSULT NOTE ADULT - ASSESSMENT
INCOMPLETE  89y F hx of AS, HF with unknown EF, 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  Plan for washout and possible fixation today by ortho.  Hematology consulted for hx of factor 11 deficiency. INCOMPLETE  89y F hx of AS, HF with unknown EF, 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  Plan for washout and possible fixation today by ortho.  Hematology consulted for hx of factor 11 deficiency.     Plan  - please check factor 11 level now prior to any products  - with hx of AS and CHF with unknown EF, would be cautious for fluid overload with products.  Ideally would be able to give a dose of Factor 11 concentrate prior to procedure as we do not know what her factor 11 level is, however we spoke with blood bank and there is no factor 11 in house.    - would recommend infusing 2 units FFP slowly starting prior to surgery and infusing through the procedure  - if any post operative bleeding can give more FFP  - f/u factor 11 tomorrow (will not result until then).  if adequate will not need anymore products.  if not, may need to talk to blood bank to give factor 11 concentrate  - discussed above with Dr. Carlyle Jones, DO  Hematology/Oncology Fellow, PGY4  Pager: [phone] 89y F hx of AS, HF with unknown EF, 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  Plan for washout and possible fixation today by ortho.  Hematology consulted for hx of factor 11 deficiency.     Plan  - please check factor 11 level now prior to any products  - with hx of AS and CHF with unknown EF, would be cautious for fluid overload with products.  Ideally would be able to give a dose of Factor 11 concentrate prior to procedure as we do not know what her factor 11 level is, however we spoke with blood bank and there is no factor 11 in house.    - would recommend infusing 2 units FFP slowly starting prior to surgery and infusing through the procedure  - if any post operative bleeding can give more FFP  - f/u factor 11 tomorrow (will not result until then).  if adequate will not need anymore products.  if not, may need to talk to blood bank to give factor 11 concentrate  - discussed above with Dr. Carlyle Jones, DO  Hematology/Oncology Fellow, PGY4  Pager: [phone]

## 2018-12-30 NOTE — ED PROVIDER NOTE - OBJECTIVE STATEMENT
89yof w/ dementia found on the floor of the bathroom at NH after an unwitnessed fall w/ deformity to the left ankle. Pt cannot provide any history. Arrives in board splints applied by EMS. Unknown mechanism or LOC. 89yof w/ dementia found on the floor of the bathroom at NH after an unwitnessed fall w/ deformity to the left ankle. Pt cannot provide any history. Arrives in board splints applied by EMS. Unknown mechanism, unknown LOC or head trauma

## 2018-12-30 NOTE — H&P ADULT - ASSESSMENT
A/P: 89y Female with open L ankle fracture  Received ancef and tetanus in ED  Plan for OR today for I&D and ORIF of left ankle fracture  NPO except meds  IVFs while NPO  Medical optimization for OR  Hold chemical dvt ppx  Pain control  NWB L LE in splint  Ice/elevation  Will discuss with Dr. Luke and advise if plan changes

## 2018-12-31 DIAGNOSIS — D68.1 HEREDITARY FACTOR XI DEFICIENCY: ICD-10-CM

## 2018-12-31 DIAGNOSIS — I10 ESSENTIAL (PRIMARY) HYPERTENSION: ICD-10-CM

## 2018-12-31 DIAGNOSIS — F32.9 MAJOR DEPRESSIVE DISORDER, SINGLE EPISODE, UNSPECIFIED: ICD-10-CM

## 2018-12-31 DIAGNOSIS — S82.899A OTHER FRACTURE OF UNSPECIFIED LOWER LEG, INITIAL ENCOUNTER FOR CLOSED FRACTURE: ICD-10-CM

## 2018-12-31 LAB
ANION GAP SERPL CALC-SCNC: 9 MMOL/L — SIGNIFICANT CHANGE UP (ref 5–17)
BUN SERPL-MCNC: 22 MG/DL — SIGNIFICANT CHANGE UP (ref 7–23)
CALCIUM SERPL-MCNC: 9.1 MG/DL — SIGNIFICANT CHANGE UP (ref 8.4–10.5)
CHLORIDE SERPL-SCNC: 106 MMOL/L — SIGNIFICANT CHANGE UP (ref 96–108)
CO2 SERPL-SCNC: 29 MMOL/L — SIGNIFICANT CHANGE UP (ref 22–31)
CREAT SERPL-MCNC: 0.91 MG/DL — SIGNIFICANT CHANGE UP (ref 0.5–1.3)
FACT XII ACT/NOR PPP: 1 % — LOW (ref 70–145)
GLUCOSE SERPL-MCNC: 108 MG/DL — HIGH (ref 70–99)
HCT VFR BLD CALC: 28.1 % — LOW (ref 34.5–45)
HGB BLD-MCNC: 8.4 G/DL — LOW (ref 11.5–15.5)
MCHC RBC-ENTMCNC: 27.5 PG — SIGNIFICANT CHANGE UP (ref 27–34)
MCHC RBC-ENTMCNC: 29.9 GM/DL — LOW (ref 32–36)
MCV RBC AUTO: 91.8 FL — SIGNIFICANT CHANGE UP (ref 80–100)
PLATELET # BLD AUTO: 308 K/UL — SIGNIFICANT CHANGE UP (ref 150–400)
POTASSIUM SERPL-MCNC: 4.5 MMOL/L — SIGNIFICANT CHANGE UP (ref 3.5–5.3)
POTASSIUM SERPL-SCNC: 4.5 MMOL/L — SIGNIFICANT CHANGE UP (ref 3.5–5.3)
RBC # BLD: 3.06 M/UL — LOW (ref 3.8–5.2)
RBC # FLD: 17.1 % — HIGH (ref 10.3–14.5)
SODIUM SERPL-SCNC: 144 MMOL/L — SIGNIFICANT CHANGE UP (ref 135–145)
WBC # BLD: 7.76 K/UL — SIGNIFICANT CHANGE UP (ref 3.8–10.5)
WBC # FLD AUTO: 7.76 K/UL — SIGNIFICANT CHANGE UP (ref 3.8–10.5)

## 2018-12-31 PROCEDURE — 99232 SBSQ HOSP IP/OBS MODERATE 35: CPT | Mod: GC

## 2018-12-31 RX ORDER — VANCOMYCIN HCL 1 G
1000 VIAL (EA) INTRAVENOUS EVERY 24 HOURS
Qty: 0 | Refills: 0 | Status: COMPLETED | OUTPATIENT
Start: 2018-12-31 | End: 2018-12-31

## 2018-12-31 RX ORDER — SENNA PLUS 8.6 MG/1
2 TABLET ORAL AT BEDTIME
Qty: 0 | Refills: 0 | Status: DISCONTINUED | OUTPATIENT
Start: 2018-12-31 | End: 2019-01-03

## 2018-12-31 RX ORDER — VANCOMYCIN HCL 1 G
1000 VIAL (EA) INTRAVENOUS EVERY 12 HOURS
Qty: 0 | Refills: 0 | Status: DISCONTINUED | OUTPATIENT
Start: 2018-12-31 | End: 2018-12-31

## 2018-12-31 RX ADMIN — PANTOPRAZOLE SODIUM 40 MILLIGRAM(S): 20 TABLET, DELAYED RELEASE ORAL at 05:52

## 2018-12-31 RX ADMIN — Medication 975 MILLIGRAM(S): at 05:51

## 2018-12-31 RX ADMIN — Medication 20 MILLIGRAM(S): at 11:13

## 2018-12-31 RX ADMIN — Medication 20 MILLIGRAM(S): at 21:59

## 2018-12-31 RX ADMIN — SENNA PLUS 2 TABLET(S): 8.6 TABLET ORAL at 22:04

## 2018-12-31 RX ADMIN — Medication 975 MILLIGRAM(S): at 22:00

## 2018-12-31 RX ADMIN — SODIUM CHLORIDE 50 MILLILITER(S): 9 INJECTION INTRAMUSCULAR; INTRAVENOUS; SUBCUTANEOUS at 05:53

## 2018-12-31 RX ADMIN — Medication 975 MILLIGRAM(S): at 11:13

## 2018-12-31 RX ADMIN — Medication 20 MILLIGRAM(S): at 05:50

## 2018-12-31 RX ADMIN — QUETIAPINE FUMARATE 25 MILLIGRAM(S): 200 TABLET, FILM COATED ORAL at 21:59

## 2018-12-31 RX ADMIN — Medication 100 MILLIGRAM(S): at 05:50

## 2018-12-31 RX ADMIN — ATORVASTATIN CALCIUM 40 MILLIGRAM(S): 80 TABLET, FILM COATED ORAL at 21:59

## 2018-12-31 RX ADMIN — Medication 250 MILLIGRAM(S): at 11:14

## 2018-12-31 NOTE — CHART NOTE - NSCHARTNOTEFT_GEN_A_CORE
Note:  spoke w/ Hematology on call re: Factor XI deficiency  Will hold off on Anti-Coagulant for now  Initially suggested Baby ASA QD  but Pt IS allergic to Aspirin...  will order KVNG Lyn ordered per heme      ***See Above  Gabriel WISDOM  Orthopedics  B: [phone]  S: [phone]

## 2018-12-31 NOTE — PROGRESS NOTE ADULT - ASSESSMENT
The impression at this time is that the patient has a displaced and comminuted left ankle fracture, most likely from recurring falls and complicated by osteoporosis.  She is s/p open reduction and internal fixation, stabilization in order to control her intractable pain and allow for healing to take place. This has been explained to the family, who chose to proceed with surgery despite the high risk secondary to her advanced age and cardiac status and accept the risk of surgery versus the inevitability of a nonsurgical nonhealing ankle fracture.  The patient is see post op resting comfortably.  She received 2 units of FFP because of the elevated PTT and factor XI deficiency and Hematology consultation was obtained. Patient seen now resting comfortably

## 2018-12-31 NOTE — PROGRESS NOTE ADULT - PROBLEM SELECTOR PLAN 1
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

## 2018-12-31 NOTE — PROGRESS NOTE ADULT - ASSESSMENT
89y F hx of AS, HF with unknown EF, 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  Plan for washout and possible fixation today by ortho.  Hematology consulted for hx of factor 11 deficiency.

## 2018-12-31 NOTE — CONSULT NOTE ADULT - SUBJECTIVE AND OBJECTIVE BOX
HPI:   Patient is a 89y female with a recent fall who was evaluated in ER 1 week ago and discharged.she was found to have an open left ankle fracture and returned to hospital yesterday.She is sp ORIF, ankle washout, for a bimalleolar open fracture.She had a urine culture sent on admission  with a negative UA that has grown enterococcus.She has been recieving cefazolin as periop prophylaxis, vanco was added yesterday.She has dementia and cannot give a history, her son at the bedside reports that she has no recent history of  infections.    REVIEW OF SYSTEMS:  All other review of systems negative (Comprehensive ROS)    PAST MEDICAL & SURGICAL HISTORY:  Aortic stenosis  Heart failure  Hemophilia A  Hypercholesterolemia  HTN (hypertension)  Depression  Cataracts, bilateral  Glaucoma  History of cataract extraction, left      Allergies    aspirin (Unknown)  sulfa drugs (Unknown)    Intolerances        Antimicrobials Day #  :day 2  vancomycin  IVPB 1000 milliGRAM(s) IV Intermittent daily    Other Medications:  acetaminophen   Tablet .. 975 milliGRAM(s) Oral every 8 hours  atorvastatin 40 milliGRAM(s) Oral at bedtime  latanoprost 0.005% Ophthalmic Solution 1 Drop(s) Both EYES at bedtime  pantoprazole    Tablet 40 milliGRAM(s) Oral before breakfast  propranolol 20 milliGRAM(s) Oral every 8 hours  QUEtiapine 25 milliGRAM(s) Oral at bedtime  sodium chloride 0.9%. 1000 milliLiter(s) IV Continuous <Continuous>  traMADol 25 milliGRAM(s) Oral every 4 hours PRN      FAMILY HISTORY:  No pertinent family history in first degree relatives      SOCIAL HISTORY:  Smoking:no     ETOH:  no   Drug Use: no   Single     T(F): 98.4 (18 @ 05:40), Max: 98.9 (18 @ 01:13)  HR: 72 (18 @ 05:40)  BP: 122/57 (18 @ 05:40)  RR: 18 (18 @ 05:40)  SpO2: 97% (18 @ 05:40)  Wt(kg): --    PHYSICAL EXAM:  General: alert, no acute distress, awake but limited interaction  Eyes:  anicteric, no conjunctival injection, no discharge  Oropharynx: no lesions or injection 	  Neck: supple, without adenopathy  Lungs: clear to auscultation  Heart: regular rate and rhythm; +ramon  Abdomen: soft, nondistended, nontender, without mass or organomegaly  Skin: no lesions  Extremities: no clubbing, cyanosis, or edema  Neurologic: alert, confused, moves all extremities  Left ankle with an ace wrap on it  LAB RESULTS:                        8.4    7.76  )-----------( 308      ( 31 Dec 2018 08:10 )             28.1         144  |  106  |  22  ----------------------------<  108<H>  4.5   |  29  |  0.91    Ca    9.1      31 Dec 2018 05:53        Urinalysis Basic - ( 30 Dec 2018 06:36 )    Color: Yellow / Appearance: Clear / S.024 / pH: x  Gluc: x / Ketone: Negative  / Bili: Negative / Urobili: Negative   Blood: x / Protein: Trace / Nitrite: Negative   Leuk Esterase: Negative / RBC: 1 /hpf / WBC 1 /hpf   Sq Epi: x / Non Sq Epi: 3 /hpf / Bacteria: Negative        MICROBIOLOGY:  RECENT CULTURES:   @ 08:29 .Urine Catheterized     >100,000 CFU/ml Enterococcus faecium            RADIOLOGY REVIEWED:  < from: Xray Ankle Complete 3 Views, Left (18 @ 04:48) >  INTERPRETATION:  CLINICAL INFORMATION: Interval reduction of previously   seen ankle fracture, evaluate.    TECHNIQUE: 3 views of the left ankle.    COMPARISON: Left ankle radiographs from 2018.    FINDINGS/   IMPRESSION:     Interval casting and reduction of previously seen acute comminuted left   medial lateral malleolus are fractures, now with interval improvement in   anatomical alignment. There is residual slight lateral displacement of   the distal fibular fracture fragment by 0.4 cm.    < end of copied text >

## 2018-12-31 NOTE — CONSULT NOTE ADULT - ASSESSMENT
89 yo female with dementia, AS, and Factor 11 deficiency s/p ORIF of open bimalleolar fracture.  I would view enterococcus as a bladder colonizer, she has no clinical evidence of infection and a benign UA.  She should receive standard periop prophylaxis as per ortho for ankle surgery  Suggest:  1.limit cefazolin as per ortho  2.technically the positive urine culture does not mandate treatment.I think 2-3 doses of vanco will be sufficient and she will not require any other antibiotics  3.Supportive care per ortho and medicine

## 2019-01-01 DIAGNOSIS — R45.1 RESTLESSNESS AND AGITATION: ICD-10-CM

## 2019-01-01 RX ADMIN — QUETIAPINE FUMARATE 25 MILLIGRAM(S): 200 TABLET, FILM COATED ORAL at 22:09

## 2019-01-01 RX ADMIN — ATORVASTATIN CALCIUM 40 MILLIGRAM(S): 80 TABLET, FILM COATED ORAL at 22:09

## 2019-01-01 RX ADMIN — LATANOPROST 1 DROP(S): 0.05 SOLUTION/ DROPS OPHTHALMIC; TOPICAL at 22:08

## 2019-01-01 RX ADMIN — SENNA PLUS 2 TABLET(S): 8.6 TABLET ORAL at 22:08

## 2019-01-01 RX ADMIN — Medication 20 MILLIGRAM(S): at 06:33

## 2019-01-01 RX ADMIN — Medication 975 MILLIGRAM(S): at 22:09

## 2019-01-01 RX ADMIN — Medication 975 MILLIGRAM(S): at 22:39

## 2019-01-01 RX ADMIN — Medication 20 MILLIGRAM(S): at 13:51

## 2019-01-01 RX ADMIN — Medication 20 MILLIGRAM(S): at 22:14

## 2019-01-01 RX ADMIN — LATANOPROST 1 DROP(S): 0.05 SOLUTION/ DROPS OPHTHALMIC; TOPICAL at 01:21

## 2019-01-01 NOTE — PHYSICAL THERAPY INITIAL EVALUATION ADULT - ACTIVE RANGE OF MOTION EXAMINATION, REHAB EVAL
grossly assessed with bed moblity, pt resisting t/o/bilateral  lower extremity Active ROM was WFL (within functional limits)/bilateral upper extremity Active ROM was WFL (within functional limits)

## 2019-01-01 NOTE — PHYSICAL THERAPY INITIAL EVALUATION ADULT - GAIT DEVIATIONS NOTED, PT EVAL
decreased step length/decreased weight-shifting ability/decreased johnson/increased time in double stance

## 2019-01-01 NOTE — PROGRESS NOTE ADULT - ASSESSMENT
89 yo female with dementia, AS, and Factor 11 deficiency, s/p ORIF of open L bimalleolar fracture.  Completed Cefazolin-> Vanco prophylaxis  Normal UA; Enterococcus c/w asymptomatic bacteriuria  Afebrile, normal WBC  No sings of infection    Plan:  Observe off antibiotics.   D/c planning as per Ortho

## 2019-01-01 NOTE — PHYSICAL THERAPY INITIAL EVALUATION ADULT - TRANSFER SAFETY CONCERNS NOTED: SIT/STAND, REHAB EVAL
decreased weight-shifting ability/decreased safety awareness/decreased sequencing ability/decreased step length

## 2019-01-01 NOTE — PHYSICAL THERAPY INITIAL EVALUATION ADULT - PERTINENT HX OF CURRENT PROBLEM, REHAB EVAL
89y Female nursing home resident ambulatory with walker presents c/o L ankle pain sp unwitnessed fall/found down at nursing home.  Unknown HS/LOC. Denies numbness/tingling. No other pain/injuries. Denies fevers/chills. s/p Left ankle I&D, ORIF 89y Female nursing home resident ambulatory with walker presents c/o L ankle pain sp unwitnessed fall/found down at nursing home.  Unknown HS/LOC. Denies numbness/tingling. No other pain/injuries. Denies fevers/chills. Xray L ankle: acute comminuted med malleolus fxm L fib fx, CT head:(-), CXR:(-); s/p Left ankle I&D, ORIF

## 2019-01-01 NOTE — PROGRESS NOTE ADULT - ASSESSMENT
S/P I&D ORIF L ankle    Plan    con't present care  ck labs  d/c planning       Wanda Ledezma PA-C   Beeper    [phone]

## 2019-01-01 NOTE — PROGRESS NOTE ADULT - ASSESSMENT
The impression at this time is that the patient had a displaced and comminuted left ankle fracture, most likely from recurring falls and complicated by osteoporosis.  She is s/p open reduction and internal fixation, stabilization in order to control her intractable pain and allow for healing to take place. This has been explained to the family, who chose to proceed with surgery despite the high risk secondary to her advanced age and cardiac status and accept the risk of surgery versus the inevitability of a nonsurgical nonhealing ankle fracture.  The patient is see post op resting comfortably.  She received 2 units of FFP because of the elevated PTT and factor XI deficiency and Hematology consultation was obtained. Patient seen now resting comfortably

## 2019-01-02 ENCOUNTER — TRANSCRIPTION ENCOUNTER (OUTPATIENT)
Age: 84
End: 2019-01-02

## 2019-01-02 LAB
ANION GAP SERPL CALC-SCNC: 13 MMOL/L — SIGNIFICANT CHANGE UP (ref 5–17)
APTT BLD: 90.5 SEC — HIGH (ref 27.5–36.3)
BUN SERPL-MCNC: 20 MG/DL — SIGNIFICANT CHANGE UP (ref 7–23)
CALCIUM SERPL-MCNC: 9.1 MG/DL — SIGNIFICANT CHANGE UP (ref 8.4–10.5)
CHLORIDE SERPL-SCNC: 104 MMOL/L — SIGNIFICANT CHANGE UP (ref 96–108)
CO2 SERPL-SCNC: 26 MMOL/L — SIGNIFICANT CHANGE UP (ref 22–31)
CREAT SERPL-MCNC: 0.7 MG/DL — SIGNIFICANT CHANGE UP (ref 0.5–1.3)
GLUCOSE SERPL-MCNC: 107 MG/DL — HIGH (ref 70–99)
HCT VFR BLD CALC: 29.7 % — LOW (ref 34.5–45)
HGB BLD-MCNC: 9.4 G/DL — LOW (ref 11.5–15.5)
INR BLD: 1.11 RATIO — SIGNIFICANT CHANGE UP (ref 0.88–1.16)
MCHC RBC-ENTMCNC: 28.4 PG — SIGNIFICANT CHANGE UP (ref 27–34)
MCHC RBC-ENTMCNC: 31.7 GM/DL — LOW (ref 32–36)
MCV RBC AUTO: 89.6 FL — SIGNIFICANT CHANGE UP (ref 80–100)
PLATELET # BLD AUTO: 351 K/UL — SIGNIFICANT CHANGE UP (ref 150–400)
POTASSIUM SERPL-MCNC: 3.8 MMOL/L — SIGNIFICANT CHANGE UP (ref 3.5–5.3)
POTASSIUM SERPL-SCNC: 3.8 MMOL/L — SIGNIFICANT CHANGE UP (ref 3.5–5.3)
PROTHROM AB SERPL-ACNC: 12.8 SEC — SIGNIFICANT CHANGE UP (ref 10–12.9)
RBC # BLD: 3.31 M/UL — LOW (ref 3.8–5.2)
RBC # FLD: 15.2 % — HIGH (ref 10.3–14.5)
SODIUM SERPL-SCNC: 143 MMOL/L — SIGNIFICANT CHANGE UP (ref 135–145)
WBC # BLD: 7.9 K/UL — SIGNIFICANT CHANGE UP (ref 3.8–10.5)
WBC # FLD AUTO: 7.9 K/UL — SIGNIFICANT CHANGE UP (ref 3.8–10.5)

## 2019-01-02 RX ADMIN — PANTOPRAZOLE SODIUM 40 MILLIGRAM(S): 20 TABLET, DELAYED RELEASE ORAL at 06:36

## 2019-01-02 RX ADMIN — QUETIAPINE FUMARATE 25 MILLIGRAM(S): 200 TABLET, FILM COATED ORAL at 22:47

## 2019-01-02 RX ADMIN — Medication 975 MILLIGRAM(S): at 22:47

## 2019-01-02 RX ADMIN — LATANOPROST 1 DROP(S): 0.05 SOLUTION/ DROPS OPHTHALMIC; TOPICAL at 22:46

## 2019-01-02 RX ADMIN — Medication 20 MILLIGRAM(S): at 06:34

## 2019-01-02 RX ADMIN — Medication 20 MILLIGRAM(S): at 22:46

## 2019-01-02 RX ADMIN — Medication 975 MILLIGRAM(S): at 15:56

## 2019-01-02 RX ADMIN — Medication 20 MILLIGRAM(S): at 13:48

## 2019-01-02 RX ADMIN — Medication 975 MILLIGRAM(S): at 06:32

## 2019-01-02 RX ADMIN — Medication 975 MILLIGRAM(S): at 13:48

## 2019-01-02 RX ADMIN — Medication 1 TABLET(S): at 13:48

## 2019-01-02 RX ADMIN — SENNA PLUS 2 TABLET(S): 8.6 TABLET ORAL at 22:46

## 2019-01-02 RX ADMIN — Medication 975 MILLIGRAM(S): at 07:02

## 2019-01-02 RX ADMIN — ATORVASTATIN CALCIUM 40 MILLIGRAM(S): 80 TABLET, FILM COATED ORAL at 22:46

## 2019-01-02 NOTE — DISCHARGE NOTE ADULT - CARE PROVIDER_API CALL
Rudi Luke (MD), Orthopaedic Surgery  [address] [place] [address]  Phone: [phone]  Fax: [phone]

## 2019-01-02 NOTE — DISCHARGE NOTE ADULT - ADDITIONAL INSTRUCTIONS
Keep surgical incision/dressing/splint clean and dry.  Maintain non-weight bearing on Left lower extremity in Trilam splint. Follow up with Dr. Luke, post operative day #15 (1/14/19), for wound check and suture removal. Follow up with your primary care provider once discharged from rehab facility

## 2019-01-02 NOTE — DISCHARGE NOTE ADULT - NS AS ACTIVITY OBS
Walking-Outdoors allowed/Do not make important decisions/Walking-Indoors allowed/Maintain non-weight bearing on Left lower extremity in Trilam splint/Do not drive or operate machinery/Stairs allowed/No Heavy lifting/straining

## 2019-01-02 NOTE — DISCHARGE NOTE ADULT - MEDICATION SUMMARY - MEDICATIONS TO TAKE
I will START or STAY ON the medications listed below when I get home from the hospital:    acetaminophen 325 mg oral tablet  -- 3 tab(s) by mouth every 8 hours, As Needed pain, fever >100.4F  -- Indication: For pain/fever    traMADol 50 mg oral tablet  -- 0.5 tab(s) by mouth every 4 hours, As needed, breakthrough pain  -- Indication: For pain    Inderal 20 mg oral tablet  -- orally every 8 hours  -- Indication: For blood pressure    escitalopram 10 mg oral tablet  -- 1 tab(s) by mouth once a day  -- Indication: For Antidepressant    atorvastatin 40 mg oral tablet  -- 1.5 tab(s) by mouth once a day (at bedtime)  -- Indication: For cholesterol    Seroquel 25 mg oral tablet  -- 1 tab(s) by mouth once a day (at bedtime)  -- Indication: For sleep    Metoprolol Succinate  mg oral tablet, extended release  -- 1 tab(s) by mouth once a day  -- Indication: For blood pressure    ipratropium-albuterol 0.5 mg-2.5 mg/3 mLinhalation solution  -- 3 milliliter(s) inhaled every 6 hours, As Needed for wheeze, SOB  -- Indication: For wheeze/cough    furosemide 20 mg oral tablet  -- 1 tab(s) by mouth once a day  -- Indication: For Diuretic    senna oral tablet  -- 2 tab(s) by mouth once a day (at bedtime)  -- Indication: For laxative    latanoprost ophthalmic 0.005% ophthalmic solution  -- 1 drop(s) to the right eye once a day (at bedtime)  -- Indication: For eyedrop    Combigan 0.2%-0.5% ophthalmic solution  -- 1 drop(s) to each affected eye every 12 hours  -- Indication: For eyedrop    PriLOSEC 40 mg oral delayed release capsule  -- 1 cap(s) by mouth once a day  -- Indication: For GERD    Multiple Vitamins oral tablet  -- 1 tab(s) by mouth once a day  -- Indication: For supplement    Vitamin D3 2000 intl units oral tablet  -- 1 tab(s) by mouth once a day  -- Indication: For supplement I will START or STAY ON the medications listed below when I get home from the hospital:    acetaminophen 325 mg oral tablet  -- 3 tab(s) by mouth every 8 hours, As Needed pain, fever >100.4F  -- Indication: For Fever/pain    traMADol 50 mg oral tablet  -- 0.5 tab(s) by mouth every 4 hours, As needed, breakthrough pain  -- Indication: For pain    Inderal 20 mg oral tablet  -- orally every 8 hours  -- Indication: For blood pressure    escitalopram 10 mg oral tablet  -- 1 tab(s) by mouth once a day  -- Indication: For Depression    atorvastatin 40 mg oral tablet  -- 1.5 tab(s) by mouth once a day (at bedtime)  -- Indication: For cholesterol    Seroquel 25 mg oral tablet  -- 1 tab(s) by mouth once a day (at bedtime)  -- Indication: For Agitation    Metoprolol Succinate  mg oral tablet, extended release  -- 1 tab(s) by mouth once a day  -- Indication: For blood pressure    ipratropium-albuterol 0.5 mg-2.5 mg/3 mLinhalation solution  -- 3 milliliter(s) inhaled every 6 hours, As Needed for wheeze, SOB  -- Indication: For wheeze    furosemide 20 mg oral tablet  -- 1 tab(s) by mouth once a day  -- Indication: For Diuretic    senna oral tablet  -- 2 tab(s) by mouth once a day (at bedtime)  -- Indication: For laxative    latanoprost ophthalmic 0.005% ophthalmic solution  -- 1 drop(s) to the right eye once a day (at bedtime)  -- Indication: For eyedrop    Combigan 0.2%-0.5% ophthalmic solution  -- 1 drop(s) to each affected eye every 12 hours  -- Indication: For eyedrop    PriLOSEC 40 mg oral delayed release capsule  -- 1 cap(s) by mouth once a day  -- Indication: For GI prevention    Multiple Vitamins oral tablet  -- 1 tab(s) by mouth once a day  -- Indication: For supplement    Vitamin D3 2000 intl units oral tablet  -- 1 tab(s) by mouth once a day  -- Indication: For supplement

## 2019-01-02 NOTE — PROGRESS NOTE ADULT - ASSESSMENT
The impression at this time is that the patient had a displaced and comminuted left ankle fracture, most likely from recurring falls and complicated by osteoporosis.  She is s/p open reduction and internal fixation, stabilization in order to control her intractable pain and allow for healing to take place. This has been explained to the family, who chose to proceed with surgery despite the high risk secondary to her advanced age and cardiac status and accept the risk of surgery versus the inevitability of a nonsurgical nonhealing ankle fracture.  The patient is see post op resting comfortably.  She received 2 units of FFP because of the elevated PTT and factor XI deficiency and Hematology consultation was obtained. Patient seen now resting comfortably has been less agitated today

## 2019-01-02 NOTE — DISCHARGE NOTE ADULT - CARE PLAN
Principal Discharge DX:	Ankle fracture  Goal:	surgical intervention should result in improved function  Assessment and plan of treatment:	Maintain non-weight bearing on Left lower extremity in Trilam splint

## 2019-01-02 NOTE — DISCHARGE NOTE ADULT - PLAN OF CARE
surgical intervention should result in improved function Maintain non-weight bearing on Left lower extremity in Trilam splint

## 2019-01-02 NOTE — DISCHARGE NOTE ADULT - PATIENT PORTAL LINK FT
You can access the [facility] Patient Portal, offered by [facility], by registering with the following website: [email or web address]

## 2019-01-02 NOTE — DISCHARGE NOTE ADULT - HOSPITAL COURSE
Reason for Admission: left open ankle fracture	  History of Present Illness: 	  89y Female nursing home resident ambulatory with walker presents c/o L ankle pain sp unwitnessed fall/found down at nursing home.  Unknown HS/LOC. Denies numbness/tingling. No other pain/injuries. Denies fevers/chills.          Review of Systems:  Other Review of Systems: All other review of systems negative, except as noted in HPI	      Allergies and Intolerances:        Allergies:  	aspirin: Drug, Unknown  	sulfa drugs: Drug Category, Unknown    Home Medications:   * Patient Currently Takes Medications as of 13-Feb-2018 12:29 documented in Structured Notes  · 	Macrobid 100 mg oral capsule: 1 cap(s) orally 2 times a day   · 	escitalopram 10 mg oral tablet: 1 tab(s) orally once a day  · 	Metoprolol Succinate  mg oral tablet, extended release: 1 tab(s) orally once a day  · 	Vitamin D3 2000 intl units oral tablet: 1 tab(s) orally once a day  · 	Combigan 0.2%-0.5% ophthalmic solution: 1 drop(s) to each affected eye every 12 hours  · 	atorvastatin 40 mg oral tablet: 1.5 tab(s) orally once a day (at bedtime)  · 	latanoprost ophthalmic 0.005% ophthalmic solution: 1 drop(s) to the right eye once a day (at bedtime)  · 	Seroquel 25 mg oral tablet: 1 tab(s) orally once a day (at bedtime)    Patient History:    Past Medical History:  Aortic stenosis    Cataracts, bilateral    Depression    Glaucoma    Heart failure    Hemophilia A    HTN (hypertension)    Hypercholesterolemia.     Past Surgical History:  History of cataract extraction, left.    Hospital Course:  89 y/o fm  underwent I&D, ORIF left open ankle fracture on 12/30/18 with Dr.A. Luke.  Patient tolerated procedure well.  Patient was evaluated postoperatively by physical and occupational therapists for non-weight bearing on left lower extremity and advised that patient would benefit from admission to rehab facilty. Patient was evaluated perioperatively by Infectious Disease, and Hematology, and recommendations were made.  Patient advised to keep surgical incision/dressing clean and dry, and have follow up with Dr. Luke post operative day #15 (1/14/19) for wound check and suture removal..

## 2019-01-03 ENCOUNTER — INBOUND DOCUMENT (OUTPATIENT)
Age: 84
End: 2019-01-03

## 2019-01-03 VITALS
TEMPERATURE: 98 F | OXYGEN SATURATION: 95 % | SYSTOLIC BLOOD PRESSURE: 152 MMHG | HEART RATE: 84 BPM | RESPIRATION RATE: 18 BRPM | DIASTOLIC BLOOD PRESSURE: 72 MMHG

## 2019-01-03 LAB
APTT BLD: 83.3 SEC — HIGH (ref 27.5–36.3)
INR BLD: 1.13 RATIO — SIGNIFICANT CHANGE UP (ref 0.88–1.16)
PROTHROM AB SERPL-ACNC: 13 SEC — HIGH (ref 10–12.9)

## 2019-01-03 PROCEDURE — P9011: CPT

## 2019-01-03 PROCEDURE — 99285 EMERGENCY DEPT VISIT HI MDM: CPT | Mod: 25

## 2019-01-03 PROCEDURE — 73590 X-RAY EXAM OF LOWER LEG: CPT

## 2019-01-03 PROCEDURE — 96374 THER/PROPH/DIAG INJ IV PUSH: CPT

## 2019-01-03 PROCEDURE — 71045 X-RAY EXAM CHEST 1 VIEW: CPT

## 2019-01-03 PROCEDURE — 97116 GAIT TRAINING THERAPY: CPT

## 2019-01-03 PROCEDURE — 97161 PT EVAL LOW COMPLEX 20 MIN: CPT

## 2019-01-03 PROCEDURE — 87086 URINE CULTURE/COLONY COUNT: CPT

## 2019-01-03 PROCEDURE — C1713: CPT

## 2019-01-03 PROCEDURE — 36430 TRANSFUSION BLD/BLD COMPNT: CPT

## 2019-01-03 PROCEDURE — 87186 SC STD MICRODIL/AGAR DIL: CPT

## 2019-01-03 PROCEDURE — P9059: CPT

## 2019-01-03 PROCEDURE — 90471 IMMUNIZATION ADMIN: CPT

## 2019-01-03 PROCEDURE — 90715 TDAP VACCINE 7 YRS/> IM: CPT

## 2019-01-03 PROCEDURE — 85027 COMPLETE CBC AUTOMATED: CPT

## 2019-01-03 PROCEDURE — 85730 THROMBOPLASTIN TIME PARTIAL: CPT

## 2019-01-03 PROCEDURE — 73610 X-RAY EXAM OF ANKLE: CPT

## 2019-01-03 PROCEDURE — C1769: CPT

## 2019-01-03 PROCEDURE — 73552 X-RAY EXAM OF FEMUR 2/>: CPT

## 2019-01-03 PROCEDURE — 85610 PROTHROMBIN TIME: CPT

## 2019-01-03 PROCEDURE — 93005 ELECTROCARDIOGRAM TRACING: CPT

## 2019-01-03 PROCEDURE — 72125 CT NECK SPINE W/O DYE: CPT

## 2019-01-03 PROCEDURE — 80048 BASIC METABOLIC PNL TOTAL CA: CPT

## 2019-01-03 PROCEDURE — 72170 X-RAY EXAM OF PELVIS: CPT

## 2019-01-03 PROCEDURE — 70450 CT HEAD/BRAIN W/O DYE: CPT

## 2019-01-03 PROCEDURE — 97530 THERAPEUTIC ACTIVITIES: CPT

## 2019-01-03 PROCEDURE — 73502 X-RAY EXAM HIP UNI 2-3 VIEWS: CPT

## 2019-01-03 PROCEDURE — 85270 CLOT FACTOR XI PTA: CPT

## 2019-01-03 PROCEDURE — 86900 BLOOD TYPING SEROLOGIC ABO: CPT

## 2019-01-03 PROCEDURE — 81001 URINALYSIS AUTO W/SCOPE: CPT

## 2019-01-03 PROCEDURE — 86850 RBC ANTIBODY SCREEN: CPT

## 2019-01-03 PROCEDURE — 94640 AIRWAY INHALATION TREATMENT: CPT

## 2019-01-03 PROCEDURE — 86901 BLOOD TYPING SEROLOGIC RH(D): CPT

## 2019-01-03 PROCEDURE — 76000 FLUOROSCOPY <1 HR PHYS/QHP: CPT

## 2019-01-03 RX ORDER — ACETAMINOPHEN 500 MG
3 TABLET ORAL
Qty: 0 | Refills: 0 | COMMUNITY
Start: 2019-01-03

## 2019-01-03 RX ORDER — FUROSEMIDE 40 MG
1 TABLET ORAL
Qty: 0 | Refills: 0 | COMMUNITY
Start: 2019-01-03

## 2019-01-03 RX ORDER — SENNA PLUS 8.6 MG/1
2 TABLET ORAL
Qty: 0 | Refills: 0 | COMMUNITY
Start: 2019-01-03

## 2019-01-03 RX ORDER — IPRATROPIUM/ALBUTEROL SULFATE 18-103MCG
3 AEROSOL WITH ADAPTER (GRAM) INHALATION
Qty: 0 | Refills: 0 | COMMUNITY
Start: 2019-01-03

## 2019-01-03 RX ORDER — FUROSEMIDE 40 MG
1 TABLET ORAL
Qty: 0 | Refills: 0 | COMMUNITY

## 2019-01-03 RX ORDER — IPRATROPIUM/ALBUTEROL SULFATE 18-103MCG
3 AEROSOL WITH ADAPTER (GRAM) INHALATION ONCE
Qty: 0 | Refills: 0 | Status: COMPLETED | OUTPATIENT
Start: 2019-01-03 | End: 2019-01-03

## 2019-01-03 RX ORDER — FUROSEMIDE 40 MG
20 TABLET ORAL DAILY
Qty: 0 | Refills: 0 | Status: DISCONTINUED | OUTPATIENT
Start: 2019-01-03 | End: 2019-01-03

## 2019-01-03 RX ORDER — TRAMADOL HYDROCHLORIDE 50 MG/1
0.5 TABLET ORAL
Qty: 0 | Refills: 0 | COMMUNITY
Start: 2019-01-03

## 2019-01-03 RX ADMIN — Medication 20 MILLIGRAM(S): at 12:47

## 2019-01-03 RX ADMIN — Medication 975 MILLIGRAM(S): at 06:35

## 2019-01-03 RX ADMIN — Medication 3 MILLILITER(S): at 07:12

## 2019-01-03 RX ADMIN — Medication 1 TABLET(S): at 12:47

## 2019-01-03 RX ADMIN — PANTOPRAZOLE SODIUM 40 MILLIGRAM(S): 20 TABLET, DELAYED RELEASE ORAL at 06:35

## 2019-01-03 RX ADMIN — Medication 20 MILLIGRAM(S): at 06:51

## 2019-01-03 RX ADMIN — Medication 975 MILLIGRAM(S): at 07:05

## 2019-01-03 NOTE — PROGRESS NOTE ADULT - SUBJECTIVE AND OBJECTIVE BOX
CC: f/u for asymptomatic bacteriuria    Patient somnolent, no distress    REVIEW OF SYSTEMS:  All other review of systems negative (Comprehensive ROS)- limited, somnolence, dementia    Antimicrobials off  Medications Reviewed    T(F): 98.2 (01-01-19 @ 10:05), Max: 99.4 (12-31-18 @ 18:14)  HR: 71 (01-01-19 @ 10:05)  BP: 144/53 (01-01-19 @ 10:05)  RR: 18 (01-01-19 @ 10:05)  SpO2: 93% (01-01-19 @ 10:05)  Wt(kg): --    PHYSICAL EXAM:  General: no acute distress  Eyes:  anicteric, no conjunctival injection, no discharge  Oropharynx: no lesions or injection 	  Neck: supple, without adenopathy  Lungs: clear to auscultation  Heart: regular rate and rhythm; systolic murmur  Abdomen: soft, nondistended, nontender, without mass or organomegaly  Skin: no lesions  Extremities: no edema.  L leg/ankle splint, dressings in place  Neurologic: somnolent, slow to arouse    LAB RESULTS:                        8.4    7.76  )-----------( 308      ( 31 Dec 2018 08:10 )             28.1     12-31    144  |  106  |  22  ----------------------------<  108<H>  4.5   |  29  |  0.91    Ca    9.1      31 Dec 2018 05:53    Urinalysis + Microscopic Examination (12.30.18 @ 06:36)    Urine Appearance: Clear    Urobilinogen: Negative    Specific Gravity: 1.024    Protein, Urine: Trace    pH Urine: 6.0    Leukocyte Esterase Concentration: Negative    Nitrite: Negative    Ketone - Urine: Negative    Bilirubin: Negative    Color: Yellow    Glucose Qualitative, Urine: Negative    Blood, Urine: Negative    Red Blood Cell - Urine: 1 /hpf    White Blood Cell - Urine: 1 /hpf    Epithelial Cells: 3 /hpf    Hyaline Casts: 1 /lpf    Bacteria: Negative    MICROBIOLOGY:  RECENT CULTURES:  12-30 @ 08:29 .Urine Catheterized Enterococcus faecium    >100,000 CFU/ml Enterococcus faecium    RADIOLOGY REVIEWED
INTERVAL HPI/OVERNIGHT EVENTS:  Patient S&E at bedside. No o/n events, patient currently confused and agitated, refuses physical exam.     VITAL SIGNS:  T(F): 98.4 (18 @ 13:38)  HR: 74 (18 @ 13:38)  BP: 120/53 (18 @ 13:38)  RR: 17 (18 @ 13:38)  SpO2: 93% (18 @ 13:38)  Wt(kg): --    PHYSICAL EXAM:  Constitutional: NAD, lethargic  Eyes: EOMI, sclera non-icteric  Neck: supple, no JVD  Extremities: no c/c. Left lower extremity bandaged  Neurological: Encephalopathic, agitated     MEDICATIONS  (STANDING):  acetaminophen   Tablet .. 975 milliGRAM(s) Oral every 8 hours  atorvastatin 40 milliGRAM(s) Oral at bedtime  latanoprost 0.005% Ophthalmic Solution 1 Drop(s) Both EYES at bedtime  pantoprazole    Tablet 40 milliGRAM(s) Oral before breakfast  propranolol 20 milliGRAM(s) Oral every 8 hours  QUEtiapine 25 milliGRAM(s) Oral at bedtime  sodium chloride 0.9%. 1000 milliLiter(s) (50 mL/Hr) IV Continuous <Continuous>    MEDICATIONS  (PRN):  traMADol 25 milliGRAM(s) Oral every 4 hours PRN breakthrough pain    Allergies  aspirin (Unknown)  sulfa drugs (Unknown)    LABS:                        8.4    7.76  )-----------( 308      ( 31 Dec 2018 08:10 )             28.1         144  |  106  |  22  ----------------------------<  108<H>  4.5   |  29  |  0.91    Ca    9.1      31 Dec 2018 05:53      PT/INR - ( 30 Dec 2018 14:06 )   PT: 12.4 sec;   INR: 1.08 ratio         PTT - ( 30 Dec 2018 14:06 )  PTT:63.9 sec  Urinalysis Basic - ( 30 Dec 2018 06:36 )    Color: Yellow / Appearance: Clear / S.024 / pH: x  Gluc: x / Ketone: Negative  / Bili: Negative / Urobili: Negative   Blood: x / Protein: Trace / Nitrite: Negative   Leuk Esterase: Negative / RBC: 1 /hpf / WBC 1 /hpf   Sq Epi: x / Non Sq Epi: 3 /hpf / Bacteria: Negative    RADIOLOGY & ADDITIONAL TESTS:  Studies reviewed.    ASSESSMENT & PLAN:
ORTHO  Patient is a 89y old  Female who presents with a chief complaint of left open ankle fracture (02 Jan 2019 22:07)    Pt. resting without complaint, confused    VS-  T(C): 36.7 (01-03-19 @ 05:07), Max: 37.1 (01-02-19 @ 13:37)  HR: 69 (01-03-19 @ 05:07) (59 - 78)  BP: 153/68 (01-03-19 @ 05:07) (102/51 - 153/68)  RR: 18 (01-03-19 @ 05:07) (18 - 18)  SpO2: 94% (01-03-19 @ 05:07) (93% - 95%)  Wt(kg): --    M.S. Alert  Extremity- Left LE short leg splint in place, elevated   digits- pink, mobile warm                             9.4    7.9   )-----------( 351      ( 02 Jan 2019 07:06 )             29.7     01-02    143  |  104  |  20  ----------------------------<  107<H>  3.8   |  26  |  0.70    Ca    9.1      02 Jan 2019 07:01
ORTHO ATTENDING POST OP    s/p I and D, ORIF L ankle  NWB L  LE  AO splint  elevation  venodynes for DVT ppx  f/u w hematology for DVT chemoprophylaxis  CBC in RR and AM  Ancef 1 g x 24h  f/u medicine/hematology  OOB to chair in AM
Orthopedic Surgery Progress Note  No acute events overnight.  Pain well controlled.  No chest pain, shortness of breath, light-headedness.    O:  Vital Signs Last 24 Hrs  T(C): 36.9 (31 Dec 2018 05:40), Max: 37.2 (31 Dec 2018 01:13)  T(F): 98.4 (31 Dec 2018 05:40), Max: 98.9 (31 Dec 2018 01:13)  HR: 72 (31 Dec 2018 05:40) (62 - 98)  BP: 122/57 (31 Dec 2018 05:40) (100/49 - 185/71)  BP(mean): 83 (30 Dec 2018 15:15) (83 - 99)  RR: 18 (31 Dec 2018 05:40) (18 - 18)  SpO2: 97% (31 Dec 2018 05:40) (93% - 100%)    Gen: NAD  LLE  Dressing C/D/I  EHL/FHL/TA/GS intact  SILT DP/SP/ROMANO/Sa  WWP distally    Labs:                        8.6    9.4   )-----------( 288      ( 30 Dec 2018 14:06 )             26.6                         9.6    9.0   )-----------( 300      ( 30 Dec 2018 04:13 )             29.7       12-31    144  |  106  |  22  ----------------------------<  108<H>  4.5   |  29  |  0.91        PT/INR - ( 30 Dec 2018 14:06 )   PT: 12.4 sec;   INR: 1.08 ratio         PTT - ( 30 Dec 2018 14:06 )  PTT:63.9 sec    A/P 89y year old female s/p Open reduction and internal fixation (ORIF) of fracture of left ankle    Pain Control  DVT PPX  PT/OOB  NWB LLE  Dispo Planning    Gab RIVERA [id number]
Patient is a 89y old  Female who presents with a chief complaint of left open ankle fracture (31 Dec 2018 17:30)      POST OPERATIVE DAY #:  2  Patient responsive, uncooperative       T(C): 36.9 (01-01-19 @ 06:30), Max: 37.4 (12-31-18 @ 18:14)  HR: 71 (01-01-19 @ 06:30) (71 - 93)  BP: 147/68 (01-01-19 @ 06:30) (100/50 - 147/68)  RR: 18 (01-01-19 @ 06:30) (17 - 18)  SpO2: 92% (01-01-19 @ 06:30) (92% - 93%)  Wt(kg): --    PHYSICAL EXAM:     EXT:  LLE  splint intact  toes warm and mobile   (+) Distal Pulses;       LABS:                        8.4<L>  7.76  )-----------( 308      ( 31 Dec 2018 08:10 )             28.1<L>    12-31    144  |  106  |  22  ----------------------------<  108<H>  4.5   |  29  |  0.91      PT/INR - ( 30 Dec 2018 14:06 )   PT: 12.4 sec;   INR: 1.08 ratio         PTT - ( 30 Dec 2018 14:06 )  PTT:63.9 sec
Patient seen and examined. Pain controlled. No acute events overnight.  Resting comfortably in bed, responsive but un cooperative.  Pt DVT ppx was stopped yesterday after speak with Heme onc.       MEDICATIONS  (STANDING):  acetaminophen   Tablet .. 975 milliGRAM(s) Oral every 8 hours  atorvastatin 40 milliGRAM(s) Oral at bedtime  latanoprost 0.005% Ophthalmic Solution 1 Drop(s) Both EYES at bedtime  multivitamin 1 Tablet(s) Oral daily  pantoprazole    Tablet 40 milliGRAM(s) Oral before breakfast  propranolol 20 milliGRAM(s) Oral every 8 hours  QUEtiapine 25 milliGRAM(s) Oral at bedtime  senna 2 Tablet(s) Oral at bedtime  sodium chloride 0.9%. 1000 milliLiter(s) IV Continuous <Continuous>    Allergies    aspirin (Unknown)  sulfa drugs (Unknown)    Intolerances                            8.4    7.76  )-----------( 308      ( 31 Dec 2018 08:10 )             28.1         Vital Signs Last 24 Hrs  T(C): 36.4 (01-02-19 @ 02:32), Max: 37.3 (01-01-19 @ 20:49)  T(F): 97.5 (01-02-19 @ 02:32), Max: 99.2 (01-01-19 @ 20:49)  HR: 71 (01-02-19 @ 04:36) (70 - 82)  BP: 146/86 (01-02-19 @ 04:36) (134/62 - 169/77)  RR: 18 (01-02-19 @ 02:32) (18 - 18)  SpO2: 93% (01-02-19 @ 02:32) (92% - 93%)    Physical Exam  Gen: NAD  LLE:   Dressing c/d/i in trilam splint  withdraws toes from pain stimuli  Toes warm and perfused with brisk cap refill  No calf ttp  Compartments soft    A/P: 89y Female sp L Ankle I&D ORIF OPD 3  Pain control  DVT ppx- will D/w Medicine and H/o to decide proper DVT with factor deficiency   PT/OT  NWB LLE in trilam splint  FU labs  Ice/elevate  Medical management appreciated  Incentive spirometry  Dispo planning- QUINTON
The patient was seen and examined today after an accidental fall with a displaced and comminuted left ankle fracture that requires orthopedic ORIF. Her comorbidities include controlled hypertension heart murmurs of AS/MI with no cardiac symptoms documented arrythmia or history of CHF, diffuse osteoarthritis with kyphoscoliosis, borderline  senile dementia and advanced age. Exam, lab, EKG and CXR are stable. The patient is medically stable, medically optimized and has     MEDICATIONS  (STANDING):  acetaminophen   Tablet .. 975 milliGRAM(s) Oral every 8 hours  atorvastatin 40 milliGRAM(s) Oral at bedtime  latanoprost 0.005% Ophthalmic Solution 1 Drop(s) Both EYES at bedtime  multivitamin 1 Tablet(s) Oral daily  pantoprazole    Tablet 40 milliGRAM(s) Oral before breakfast  propranolol 20 milliGRAM(s) Oral every 8 hours  QUEtiapine 25 milliGRAM(s) Oral at bedtime  senna 2 Tablet(s) Oral at bedtime  sodium chloride 0.9%. 1000 milliLiter(s) (50 mL/Hr) IV Continuous <Continuous>    MEDICATIONS  (PRN):  traMADol 25 milliGRAM(s) Oral every 4 hours PRN breakthrough pain          VITALS:   T(C): 36.9 (18 @ 13:38), Max: 37.2 (18 @ 01:13)  HR: 74 (18 @ 13:38) (62 - 98)  BP: 120/53 (18 @ 13:38) (100/49 - 143/81)  RR: 17 (18 @ 13:38) (17 - 18)  SpO2: 93% (18 @ 13:38) (93% - 100%)  Wt(kg): --      physical exam  General: WN/WD NAD  Neurology: slight slurred speech  Eyes: PERRLA/ EOMI, Gross vision intact  ENT/Neck: Neck supple, trachea midline, No JVD, Gross hearing intact  Respiratory: CTA B/L, No wheezing, rales, rhonchi  CV: RRR, S1S2, no murmurs, rubs or gallops  Abdominal: Soft, NT, ND +BS,   Extremities: No edema, + peripheral pulses  Skin: No Rashes, Hematoma, Ecchymosis      LABS:        CBC Full  -  ( 31 Dec 2018 08:10 )  WBC Count : 7.76 K/uL  Hemoglobin : 8.4 g/dL  Hematocrit : 28.1 %  Platelet Count - Automated : 308 K/uL  Mean Cell Volume : 91.8 fl  Mean Cell Hemoglobin : 27.5 pg  Mean Cell Hemoglobin Concentration : 29.9 gm/dL  Auto Neutrophil # : x  Auto Lymphocyte # : x  Auto Monocyte # : x  Auto Eosinophil # : x  Auto Basophil # : x  Auto Neutrophil % : x  Auto Lymphocyte % : x  Auto Monocyte % : x  Auto Eosinophil % : x  Auto Basophil % : x    12-31    144  |  106  |  22  ----------------------------<  108<H>  4.5   |  29  |  0.91    Ca    9.1      31 Dec 2018 05:53        PT/INR - ( 30 Dec 2018 14:06 )   PT: 12.4 sec;   INR: 1.08 ratio         PTT - ( 30 Dec 2018 14:06 )  PTT:63.9 sec  Urinalysis Basic - ( 30 Dec 2018 06:36 )    Color: Yellow / Appearance: Clear / S.024 / pH: x  Gluc: x / Ketone: Negative  / Bili: Negative / Urobili: Negative   Blood: x / Protein: Trace / Nitrite: Negative   Leuk Esterase: Negative / RBC: 1 /hpf / WBC 1 /hpf   Sq Epi: x / Non Sq Epi: 3 /hpf / Bacteria: Negative      CAPILLARY BLOOD GLUCOSE          RADIOLOGY & ADDITIONAL TESTS:
The patient was seen and examined today after an accidental fall with a displaced and comminuted left ankle fracture that requires orthopedic ORIF. Her comorbidities include controlled hypertension heart murmurs of AS/MI with no cardiac symptoms documented arrythmia or history of CHF, diffuse osteoarthritis with kyphoscoliosis, borderline  senile dementia and advanced age. Exam, lab, EKG and CXR are stable. The patient tolerated procedure well. has had issues with confusion and agitation    MEDICATIONS  (STANDING):  acetaminophen   Tablet .. 975 milliGRAM(s) Oral every 8 hours  atorvastatin 40 milliGRAM(s) Oral at bedtime  latanoprost 0.005% Ophthalmic Solution 1 Drop(s) Both EYES at bedtime  multivitamin 1 Tablet(s) Oral daily  pantoprazole    Tablet 40 milliGRAM(s) Oral before breakfast  propranolol 20 milliGRAM(s) Oral every 8 hours  QUEtiapine 25 milliGRAM(s) Oral at bedtime  senna 2 Tablet(s) Oral at bedtime  sodium chloride 0.9%. 1000 milliLiter(s) (50 mL/Hr) IV Continuous <Continuous>    MEDICATIONS  (PRN):  traMADol 25 milliGRAM(s) Oral every 4 hours PRN breakthrough pain          VITALS:   T(C): 36.5 (01-02-19 @ 21:34), Max: 37.1 (01-02-19 @ 13:37)  HR: 74 (01-02-19 @ 21:34) (61 - 78)  BP: 132/62 (01-02-19 @ 21:34) (102/51 - 169/77)  RR: 18 (01-02-19 @ 21:34) (18 - 18)  SpO2: 95% (01-02-19 @ 21:34) (93% - 95%)  Wt(kg): --    physical exam  General: WN/WD NAD  Neurology: slight slurred speech  Eyes: PERRLA/ EOMI, Gross vision intact  ENT/Neck: Neck supple, trachea midline, No JVD, Gross hearing intact  Respiratory: CTA B/L, No wheezing, rales, rhonchi  CV: RRR, S1S2, no murmurs, rubs or gallops  Abdominal: Soft, NT, ND +BS,   Extremities: No edema, + peripheral pulses  Skin: No Rashes, Hematoma, Ecchymosis    LABS:        CBC Full  -  ( 02 Jan 2019 07:06 )  WBC Count : 7.9 K/uL  Hemoglobin : 9.4 g/dL  Hematocrit : 29.7 %  Platelet Count - Automated : 351 K/uL  Mean Cell Volume : 89.6 fl  Mean Cell Hemoglobin : 28.4 pg  Mean Cell Hemoglobin Concentration : 31.7 gm/dL  Auto Neutrophil # : x  Auto Lymphocyte # : x  Auto Monocyte # : x  Auto Eosinophil # : x  Auto Basophil # : x  Auto Neutrophil % : x  Auto Lymphocyte % : x  Auto Monocyte % : x  Auto Eosinophil % : x  Auto Basophil % : x    01-02    143  |  104  |  20  ----------------------------<  107<H>  3.8   |  26  |  0.70    Ca    9.1      02 Jan 2019 07:01        PT/INR - ( 02 Jan 2019 07:05 )   PT: 12.8 sec;   INR: 1.11 ratio         PTT - ( 02 Jan 2019 07:05 )  PTT:90.5 sec    CAPILLARY BLOOD GLUCOSE          RADIOLOGY & ADDITIONAL TESTS:
The patient was seen and examined today after an accidental fall with a displaced and comminuted left ankle fracture that requires orthopedic ORIF. Her comorbidities include controlled hypertension heart murmurs of AS/MI with no cardiac symptoms documented arrythmia or history of CHF, diffuse osteoarthritis with kyphoscoliosis, borderline  senile dementia and advanced age. Exam, lab, EKG and CXR are stable. The patient tolerated procedure well. has had issues with confusion and agitation    MEDICATIONS  (STANDING):  acetaminophen   Tablet .. 975 milliGRAM(s) Oral every 8 hours  atorvastatin 40 milliGRAM(s) Oral at bedtime  latanoprost 0.005% Ophthalmic Solution 1 Drop(s) Both EYES at bedtime  multivitamin 1 Tablet(s) Oral daily  pantoprazole    Tablet 40 milliGRAM(s) Oral before breakfast  propranolol 20 milliGRAM(s) Oral every 8 hours  QUEtiapine 25 milliGRAM(s) Oral at bedtime  senna 2 Tablet(s) Oral at bedtime  sodium chloride 0.9%. 1000 milliLiter(s) (50 mL/Hr) IV Continuous <Continuous>    MEDICATIONS  (PRN):  traMADol 25 milliGRAM(s) Oral every 4 hours PRN breakthrough pain          VITALS:   T(C): 36.5 (01-02-19 @ 21:34), Max: 37.1 (01-02-19 @ 13:37)  HR: 74 (01-02-19 @ 21:34) (61 - 78)  BP: 132/62 (01-02-19 @ 21:34) (102/51 - 169/77)  RR: 18 (01-02-19 @ 21:34) (18 - 18)  SpO2: 95% (01-02-19 @ 21:34) (93% - 95%)  Wt(kg): --    physical exam  General: WN/WD NAD  Neurology: slight slurred speech  Eyes: PERRLA/ EOMI, Gross vision intact  ENT/Neck: Neck supple, trachea midline, No JVD, Gross hearing intact  Respiratory: CTA B/L, No wheezing, rales, rhonchi  CV: RRR, S1S2, no murmurs, rubs or gallops  Abdominal: Soft, NT, ND +BS,   Extremities: No edema, + peripheral pulses  Skin: No Rashes, Hematoma, Ecchymosis    LABS:        CBC Full  -  ( 02 Jan 2019 07:06 )  WBC Count : 7.9 K/uL  Hemoglobin : 9.4 g/dL  Hematocrit : 29.7 %  Platelet Count - Automated : 351 K/uL  Mean Cell Volume : 89.6 fl  Mean Cell Hemoglobin : 28.4 pg  Mean Cell Hemoglobin Concentration : 31.7 gm/dL  Auto Neutrophil # : x  Auto Lymphocyte # : x  Auto Monocyte # : x  Auto Eosinophil # : x  Auto Basophil # : x  Auto Neutrophil % : x  Auto Lymphocyte % : x  Auto Monocyte % : x  Auto Eosinophil % : x  Auto Basophil % : x    01-02    143  |  104  |  20  ----------------------------<  107<H>  3.8   |  26  |  0.70    Ca    9.1      02 Jan 2019 07:01        PT/INR - ( 02 Jan 2019 07:05 )   PT: 12.8 sec;   INR: 1.11 ratio         PTT - ( 02 Jan 2019 07:05 )  PTT:90.5 sec    CAPILLARY BLOOD GLUCOSE          RADIOLOGY & ADDITIONAL TESTS:
The patient was seen and examined today after an accidental fall with a displaced and comminuted left ankle fracture that requires orthopedic ORIF. Her comorbidities include controlled hypertension heart murmurs of AS/MI with no cardiac symptoms documented arrythmia or history of CHF, diffuse osteoarthritis with kyphoscoliosis, borderline  senile dementia and advanced age. Exam, lab, EKG and CXR are stable. The patient tolerated procedure well. has had issues with confusion and agitation    MEDICATIONS  (STANDING):  acetaminophen   Tablet .. 975 milliGRAM(s) Oral every 8 hours  atorvastatin 40 milliGRAM(s) Oral at bedtime  latanoprost 0.005% Ophthalmic Solution 1 Drop(s) Both EYES at bedtime  multivitamin 1 Tablet(s) Oral daily  pantoprazole    Tablet 40 milliGRAM(s) Oral before breakfast  propranolol 20 milliGRAM(s) Oral every 8 hours  QUEtiapine 25 milliGRAM(s) Oral at bedtime  senna 2 Tablet(s) Oral at bedtime  sodium chloride 0.9%. 1000 milliLiter(s) (50 mL/Hr) IV Continuous <Continuous>    MEDICATIONS  (PRN):  traMADol 25 milliGRAM(s) Oral every 4 hours PRN breakthrough pain          VITALS:   T(C): 37.3 (01-01-19 @ 20:49), Max: 37.3 (01-01-19 @ 20:49)  HR: 82 (01-01-19 @ 20:49) (71 - 82)  BP: 160/80 (01-01-19 @ 20:49) (100/50 - 160/80)  RR: 18 (01-01-19 @ 20:49) (18 - 18)  SpO2: 92% (01-01-19 @ 20:49) (92% - 93%)  Wt(kg): --    physical exam  General: WN/WD NAD  Neurology: slight slurred speech  Eyes: PERRLA/ EOMI, Gross vision intact  ENT/Neck: Neck supple, trachea midline, No JVD, Gross hearing intact  Respiratory: CTA B/L, No wheezing, rales, rhonchi  CV: RRR, S1S2, no murmurs, rubs or gallops  Abdominal: Soft, NT, ND +BS,   Extremities: No edema, + peripheral pulses  Skin: No Rashes, Hematoma, Ecchymosis    LABS:        CBC Full  -  ( 31 Dec 2018 08:10 )  WBC Count : 7.76 K/uL  Hemoglobin : 8.4 g/dL  Hematocrit : 28.1 %  Platelet Count - Automated : 308 K/uL  Mean Cell Volume : 91.8 fl  Mean Cell Hemoglobin : 27.5 pg  Mean Cell Hemoglobin Concentration : 29.9 gm/dL  Auto Neutrophil # : x  Auto Lymphocyte # : x  Auto Monocyte # : x  Auto Eosinophil # : x  Auto Basophil # : x  Auto Neutrophil % : x  Auto Lymphocyte % : x  Auto Monocyte % : x  Auto Eosinophil % : x  Auto Basophil % : x    12-31    144  |  106  |  22  ----------------------------<  108<H>  4.5   |  29  |  0.91    Ca    9.1      31 Dec 2018 05:53            CAPILLARY BLOOD GLUCOSE          RADIOLOGY & ADDITIONAL TESTS:
ORTHO POC NOTE      Resting without complaints.      T(C): 36.3 (12-30-18 @ 15:30), Max: 36.9 (12-30-18 @ 10:31)  HR: 68 (12-30-18 @ 15:30) (67 - 88)  BP: 124/61 (12-30-18 @ 15:30) (112/99 - 185/71)  RR: 18 (12-30-18 @ 15:30) (16 - 18)  SpO2: 98% (12-30-18 @ 15:30) (93% - 100%)      Exam:   No Acute Distress, Arousable  Card: +S1/S2, RRR  Pulm: CTAB  Ext: LLE: ACE dressing C/D/I, Toes warm, brisk cap refill, mobile with dull Sensation grossly intact to light touch.  Right Calve soft, non-tender   (+) PF/DF  (+) Distal pulses    Postop Xray: In chart                          8.6    9.4   )-----------( 288      ( 30 Dec 2018 14:06 )             26.6    12-30    144  |  105  |  24<H>  ----------------------------<  138<H>  4.6   |  27  |  0.87    Ca    9.3      30 Dec 2018 14:06          Patient is a 89y old Female w PMH CHF, AS, Dementia, Depression Hemophilia A, HTN, Glaucoma S/P Irrigation and debridement of grade II open bimalleolar ankle fracture with open reduction internal fixation    Plan:  - Pain Control PRN  - DVT ppx- Lovenox  - LLE NWB  - PT consult pending   - Venodynes/IS  - Clear liquid diet, advance to regular as tolerated   - Check CBC in am     Parveen Gould PA-C  Orthopedic Surgery  [phone]

## 2019-01-03 NOTE — PROGRESS NOTE ADULT - PROVIDER SPECIALTY LIST ADULT
Heme/Onc
Infectious Disease
Internal Medicine
Orthopedics

## 2019-01-03 NOTE — PROGRESS NOTE ADULT - ASSESSMENT
Impression: Stable       Plan:   Continue present treatment                 Out of bed, ambulate, non-weight bearing                  Physical therapy follow up                  Continue to monitor    Stephen Asencio PA-C  Orthopaedic Surgery  Team pager [phone]  [phone]

## 2019-01-03 NOTE — PROGRESS NOTE ADULT - REASON FOR ADMISSION
left open ankle fracture
Left open ankle fracture/ ORIF
left open ankle fracture

## 2019-01-03 NOTE — PROGRESS NOTE ADULT - ATTENDING COMMENTS
Pt seen and examined.  Exam and plan as above
Pt seen and examined.  Exam and plan as above.
Pt seen s/p minimally invasive procedure. Would repeat PTT and CBC. No new signs or symptoms of bleeding. Would hold off on further FFP unless any new signs of bleeding. ASA prophylaxis as per orthopedics after surgery.
I am a non participating [employer] physician seeing Pt in coverage for Dr Couch
DC to rehab  continue current meds  activity as tolerated  physical therapy as tolerated  family aware of plan  I am a non participating [employer] physician seeing Pt in coverage for Dr Couch
I am a non participating [employer] physician seeing Pt in coverage for Dr Couch
I am a non participating [employer] physician seeing Pt in coverage for Dr Couch

## 2019-01-16 ENCOUNTER — APPOINTMENT (OUTPATIENT)
Dept: ORTHOPEDIC SURGERY | Facility: CLINIC | Age: 84
End: 2019-01-16
Payer: MEDICARE

## 2019-01-16 VITALS — BODY MASS INDEX: 21.26 KG/M2 | WEIGHT: 120 LBS | HEIGHT: 63 IN

## 2019-01-16 DIAGNOSIS — Z86.39 PERSONAL HISTORY OF OTHER ENDOCRINE, NUTRITIONAL AND METABOLIC DISEASE: ICD-10-CM

## 2019-01-16 DIAGNOSIS — Z86.79 PERSONAL HISTORY OF OTHER DISEASES OF THE CIRCULATORY SYSTEM: ICD-10-CM

## 2019-01-16 DIAGNOSIS — Z87.39 PERSONAL HISTORY OF OTHER DISEASES OF THE MUSCULOSKELETAL SYSTEM AND CONNECTIVE TISSUE: ICD-10-CM

## 2019-01-16 DIAGNOSIS — Z78.9 OTHER SPECIFIED HEALTH STATUS: ICD-10-CM

## 2019-01-16 DIAGNOSIS — Z80.3 FAMILY HISTORY OF MALIGNANT NEOPLASM OF BREAST: ICD-10-CM

## 2019-01-16 PROCEDURE — 99024 POSTOP FOLLOW-UP VISIT: CPT

## 2019-01-18 RX ORDER — ATORVASTATIN CALCIUM 80 MG/1
TABLET, FILM COATED ORAL
Refills: 0 | Status: ACTIVE | COMMUNITY

## 2019-01-18 NOTE — HISTORY OF PRESENT ILLNESS
[Doing Well] : is doing well [Excellent Pain Control] : has excellent pain control [No Sign of Infection] : is showing no signs of infection [de-identified] : S/P ORIF left open G2 bimalleolar ankle fracture   [de-identified] : 89 yo F S/p ORIF and closure of LT G2 open bimalleolar ankle fracture 12/30/18. doing well at [facility]   [de-identified] : Physical exam of the LT Ankle: The splint was removed and the surgical incisions and traumatic laceration were inspected. There are well healed incisions/ laceration with no erythema, rashes blisters or skin breakdown. The sutures are in place. There is normal postoperative/post fracture edema and ecchymosis. ROM is neutral dorsiflexion to 5 degrees plantar flexion,  There is a normal neurovascular exam with 2+ distal pulses.  [de-identified] : S/P ORIF left open G2 bimalleolar ankle fracture  12/30/18 \par P: \par lateral side sutures removed \par - remove medial traumatic laceration sutures in 2 weeks in rehab  ( instructions given) \par - continue NWB \par - camboot given \par - F/U in 4 weeks with XR

## 2019-02-13 ENCOUNTER — APPOINTMENT (OUTPATIENT)
Dept: ORTHOPEDIC SURGERY | Facility: CLINIC | Age: 84
End: 2019-02-13
Payer: COMMERCIAL

## 2019-02-13 DIAGNOSIS — S82.842E: ICD-10-CM

## 2019-02-13 PROCEDURE — 73610 X-RAY EXAM OF ANKLE: CPT | Mod: LT

## 2019-02-13 PROCEDURE — 99024 POSTOP FOLLOW-UP VISIT: CPT

## 2019-02-19 PROBLEM — S82.842E: Status: ACTIVE | Noted: 2019-01-15

## 2019-04-27 NOTE — ED POST DISCHARGE NOTE - DETAILS
Spoke to Alicia at [facility] who is nurse caring for patient, discussed results. She asks results be faxed to facility for doctor review so patient can be properly treated (fax [phone]). Results printed and faxed -Jennifer Lizama PA-C dw icu pa, stable on bipap, may admit to tele

## 2019-05-01 ENCOUNTER — APPOINTMENT (OUTPATIENT)
Dept: ORTHOPEDIC SURGERY | Facility: CLINIC | Age: 84
End: 2019-05-01

## 2019-12-18 NOTE — CONSULT NOTE ADULT - PROVIDER SPECIALTY LIST ADULT
Infectious Disease
Heme/Onc
Internal Medicine
Patient/Caregiver provided printed discharge information.

## 2020-04-24 NOTE — ED PROVIDER NOTE - PROGRESS NOTE ADDITIONAL2
History     Chief Complaint   Patient presents with     Fever     coming by EMS from assisted Living. for fever, cough. poss pneumonia.     HPI  Stefanie Velazquez is a 77 year old female, past medical history is significant for DVT, lumbar radiculopathy, stage IV kidney disease, oropharyngeal dysphagia, bilateral hearing loss, type 2 diabetes, restless leg syndrome, chronic diarrhea, hypertension, bowel and bladder incontinence, B12 deficiency, vitamin D deficiency, anemia, rectal cancer, hyperlipidemia, neuropathy, obstructive sleep apnea, osteoporosis, GERD, hypothyroidism, chronic ischemic heart disease, ASCVD, dementia, presents to the emergency department by EMS from assisted living with concerns of fever, cough and concerns of possible pneumonia.  History is supplied by note from nursing home, the patient herself given her baseline cognitive impairment and extreme hard of hearing is able to give little history herself.  I note the patient was admitted here on 4/6/2020, had intubation from 4/7 through 4/9; that admission was reviewed.  Unclear as for how long the patient has had cough possibly a couple of weeks, suspicion of possible aspiration although no witnessed event, and fever apparently just today.  The patient denies the presence of any pain at the time of presentation, she also notes that her right leg is in a cam walker for a closed nondisplaced fracture of the phalanx of the right great toe.       Allergies:  Allergies   Allergen Reactions     Ciprofloxacin Anxiety     Pt developed agitation, paranoia while on cipro--on clear if this is what caused it.  But would try to avoid in future.     Hydrocodone Other (See Comments)     dizzy     Lisinopril Cough            Vicodin [Hydrocodone-Acetaminophen] Other (See Comments)     Caused extreme dizziness       Problem List:    Patient Active Problem List    Diagnosis Date Noted     Altered mental status 04/07/2020     Priority: Medium     Acute metabolic  encephalopathy 2020     Priority: Medium     H/O deep venous thrombosis 2019     Priority: Medium     Syncope 2019     Priority: Medium     Syncope and collapse 2019     Priority: Medium     Impacted cerumen of right ear 2019     Priority: Medium     CKD (chronic kidney disease) stage 4, GFR 15-29 ml/min (H) 2019     Priority: Medium     Lumbar radiculopathy 2019     Priority: Medium     Oropharyngeal dysphagia 2018     Priority: Medium     Bilateral hearing loss, unspecified hearing loss type 2018     Priority: Medium     Lymphedema of genitalia 2018     Priority: Medium     Spinal stenosis of lumbar region without neurogenic claudication 2018     Priority: Medium     DDD (degenerative disc disease), lumbar 2018     Priority: Medium     Symptomatic bradycardia 10/29/2017     Priority: Medium     Type 2 diabetes mellitus with diabetic nephropathy, with long-term current use of insulin (H) 2017     Priority: Medium     Restless leg syndrome 2017     Priority: Medium     Chronic diarrhea 2017     Priority: Medium     Health Care Home 10/28/2016     Priority: Medium     [facility] Care Coordination  JUNE Vega  Phone: [phone]    [facility] CARE PLAN SUMMARY    Member Name:  Stefanie Velazquez        *Assisted Living*  Address:   [place] [address] Phone: [phone] (Home)    :  1943 Date of Assessment: 3/30/2020   Health Plan:  [facility]  Health Plan Number: [id number] Medical Assistance Number: [id number]  Financial Worker:    Case #:     [facility] Care Coordinator:    JUNE Vega CC Phone: [phone]  CC Fax:  [phone]   FVP Enrollment Date: 2019 Case Mix:  A-  Rate Cell:  B   Waiver Type:  EW   Primary Emergency Contact: Lindajaelyn Dori (niece)  Address: [address] [place] [address]  Mobile Phone:  [phone]  Relation: Relative  Secondary Emergency Contact: Toshia Lori (niece)           [place], [place]   Home Phone: [phone]  Mobile Phone: [phone]  Relation: Relative Language:  English  :  No   Health Care Agent/POA:  Yes Advanced Directives/Living Will:  Yes   Primary Care Clinic/Phone/Fax:  [facility]/(p) [phone], (f) [phone] Primary Dx:   R69  Secondary Dx:     Primary Physician:  Sandra Medina   Height:  5' 0''  Weight:  201 lbs   Specialty Physician:    Audiologist:     Eye Care Provider:   Dental Care Provider:    [facility]: [facility] [phone] or [phone]   Other:        [facility] CURRENT SERVICES SUMMARY  Equipment owned/DME history: WC 2/2020)  SERVICE TYPE/PROVIDER NAME/PHONE AUTH DATE FREQUENCY Units OR $ Amt DESCRIPTION   Medical Transportation: [facility] [phone]  Fax:   As needed Varies    [facility]  Phone: [phone]    Fax:  Monthly Per [employer]          * For ADC please select ADC provider and [employer] in order to process auth               Benign essential hypertension 09/30/2016     Priority: Medium     Bowel and bladder incontinence 05/22/2015     Priority: Medium     Dysuria 10/24/2013     Priority: Medium     Vitamin B 12 deficiency 05/14/2012     Priority: Medium     Vitamin D deficiency 05/14/2012     Priority: Medium     Radiation therapy complication 11/09/2011     Priority: Medium     Anemia 08/26/2011     Priority: Medium     Rectal cancer- newly diagnosed adenoCA rectum Jan 2011 and Positive Tubular Adenoma 2019 02/17/2011     Priority: Medium     Hyperlipidemia LDL goal <100 10/31/2010     Priority: Medium     Neuropathy (H) 04/09/2010     Priority: Medium     RC-moderate (AHI 12, LSat 60%); REM RDI-73 06/01/2009     Priority: Medium     HST performed 11/11/2019 with weight 201 lbs.  Analysis time 540.2 minutes.  AHI 6.8.  Baseline SpO2 91.1%, <= 88% for  43 minutes.    Sleep study [facility] was performed 5/26/09 in order to re-evaluate severity of RC. The total sleep time was 412.0 minutes. The sleep latency was decreased at 8.7 minutes with Ambien 10mg. The REM sleep latency was 426.0 minutes. Sleep efficiency was reduced at 79.0%. The sleep architecture was disrupted with frequent sleep stage changes and arousals.  Snoring:  loud. Respiratory Events:   RDI of 57.5 and an AHI of 12.2. The REM RDI was 74.3 The lowest O2 saturation was 60.0%. This study is suggestive of moderate sleep apnea, profound desaturations during REM sleep, with 35 second apneas.    Other: PLM index was 13.8.      Recommendations:  Due to the profound desaturations during REM sleep, consider CPAP titration study to establish optimal CPAP treatment pressure.  2. Check ferritin given her RLS symptoms. If RLS symptoms persist after treatment of RC, further therapy may be warranted. Replace iron as indicated by ferritin.         Osteoporosis 02/29/2008     Priority: Medium     Problem list name updated by automated process. Provider to review       Esophageal reflux 10/13/2007     Priority: Medium     On protonix;        bmi 40 06/22/2005     Priority: Medium     Problem list name updated by automated process. Provider to review       Generalized osteoarthrosis, unspecified site 06/22/2005     Priority: Medium     HEARING LOSS CONDUCTIVE, COMBINED TYPE 06/22/2005     Priority: Medium     Polish measles as child       Hypothyroidism 06/22/2003     Priority: Medium     Problem list name updated by automated process. Provider to review       Chronic ischemic heart disease 06/22/2003     Priority: Medium     Class: Chronic     cabgx3 09/2002,  with a left internal mammary (LIMA) to the left anterior descending and a bypass graft to the obtuse marginal branch of the circumflex and also PDA branch of the right coronary artery. She had an episode of atrial fibrillation postoperatively and was treated  with sotalol.   PTCA and stent placement for recurrent restenosis.   2/05 adenosine ef70%  9/24/12 - Lexiscan nuclear stress test was positive for mild partially reversible ischemia in the LAD distribution. Imdur added.              Past Medical History:    Past Medical History:   Diagnosis Date     Acute deep vein thrombosis (DVT) of right lower extremity, unspecified vein (H) 10/31/2018     Acute myocardial infarction of other specified sites, episode of care unspecified 6/2002     Cancer of colon (H)      Cellulitis of lower extremity, bilateral 9/30/2016     Chronic ischemic heart disease, unspecified 6/22/2003     Coronary atherosclerosis of unspecified type of vessel, native or graft      Diabetes mellitus (H)      Esophageal reflux      Fracture of femur, distal, left, closed (H) 2/11/2013     Gastro-oesophageal reflux disease      Generalized osteoarthrosis, unspecified site 6/22/2005     Generalized osteoarthrosis, unspecified site 6/22/2005     HEARING LOSS CONDUCTIVE, COMBINED TYPE 6/22/2005     HYPOTHYROIDISM  6/22/2003     Mixed hyperlipidemia 6/22/2005     Obesity, unspecified 6/22/2005     RC-moderate (AHI 12, LSat 60%); REM RDI-73 6/1/2009     Recurrent acute deep vein thrombosis (DVT) of both lower extremities (H) 3/31/2019     Rubecheli      Stented coronary artery      Type II or unspecified type diabetes mellitus with renal manifestations, uncontrolled(250.42) (H) 6/22/2005     Type II or unspecified type diabetes mellitus with renal manifestations, uncontrolled(250.42) (H) 6/22/2005     Unspecified disorder resulting from impaired renal function 6/22/2005     Unspecified essential hypertension        Past Surgical History:    Past Surgical History:   Procedure Laterality Date     ANESTHESIA OUT OF OR MRI N/A 4/8/2020    Procedure: ANESTHESIA OUT OF OR MRI;  Surgeon: GENERIC ANESTHESIA PROVIDER;  Location: [facility]     cabg       COLECTOMY LOW ANTERIOR  6/21/2011    Procedure:COLECTOMY LOW ANTERIOR;  with loop ielostomy; Surgeon:ROBBIN MCDONNELL; Location:[facility]     COLONOSCOPY  1/26/2011    COLONOSCOPY performed by MASOUD BILL at [facility]     COLONOSCOPY N/A 3/1/2016    Procedure: COLONOSCOPY;  Surgeon: Liza Neal MD;  Location: [facility]     INSERT PORT VASCULAR ACCESS  3/2/2011    INSERT PORT VASCULAR ACCESS performed by LIZA NEAL at [facility]     OPEN REDUCTION INTERNAL FIXATION FEMUR DISTAL  2/12/2013    Procedure: OPEN REDUCTION INTERNAL FIXATION FEMUR DISTAL;  Open reduction internal fixation left femur fracture--[facility];  Surgeon: Ley, Jeffrey Duane, MD;  Location: [facility]     ORTHOPEDIC SURGERY       PHACOEMULSIFICATION WITH STANDARD INTRAOCULAR LENS IMPLANT Left 1/22/2018    Procedure: PHACOEMULSIFICATION WITH STANDARD INTRAOCULAR LENS IMPLANT;  Left cataract removal with implant;  Surgeon: Rony Key MD;  Location: [facility]     PHACOEMULSIFICATION WITH STANDARD INTRAOCULAR LENS IMPLANT Right 2/19/2018    Procedure: PHACOEMULSIFICATION WITH STANDARD INTRAOCULAR LENS IMPLANT;  Right cataract removal with implant;  Surgeon: Rony Key MD;  Location: [facility]     SIGMOIDOSCOPY FLEXIBLE  9/9/2011    Procedure:SIGMOIDOSCOPY FLEXIBLE; Performed prior to induction.; Surgeon:ROBBIN MCDONNELL; Location: OR     SURGICAL HISTORY OF -   10/24/2002    Heart bypass     SURGICAL HISTORY OF -   2002    R Knee arthroplasty     SURGICAL HISTORY OF -   2002    Mi 2 stints     TAKEDOWN ILEOSTOMY  9/9/2011    Procedure:TAKEDOWN ILEOSTOMY; Exploratory Laparotomy,  Loop Ileostomy Takedown, Small Bowel Resection x 2.; Surgeon:ROBBIN MCDONNELL; Location: OR       Family History:    Family History   Problem Relation Age of Onset     Diabetes Sister      C.A.D. Sister      Breast Cancer Sister        Social History:  Marital Status:  Single [1]  Social History     Tobacco Use     Smoking status: Former Smoker     Smokeless tobacco: Never Used   Substance Use Topics      "Alcohol use: Yes     Comment: rare social use     Drug use: No        Medications:    ACCU-CHEK MILVIA MELODY  acetaminophen (TYLENOL) 650 MG CR tablet  aspirin (ASA) 81 MG tablet  blood glucose monitoring (ACCU-CHEK MILVIA) test strip  blood glucose monitoring (ACCU-CHEK MULTICLIX) lancets  cholecalciferol 1000 units TABS  Continuous Blood Gluc  (FREESTYLE JAJA 14 DAY READER) MELODY  Continuous Blood Gluc Sensor (FREESTYLE JAJA 14 DAY SENSOR) MISC  diclofenac (VOLTAREN) 50 MG EC tablet  ELIQUIS ANTICOAGULANT 5 MG tablet  ferrous sulfate (FEROSUL) 325 (65 Fe) MG tablet  fluocinonide (LIDEX) 0.05 % ointment  fluticasone (FLONASE) 50 MCG/ACT spray  furosemide (LASIX) 20 MG tablet  gabapentin (NEURONTIN) 100 MG capsule  hydrocortisone (ANUSOL-HC) 2.5 % cream  insulin aspart (NOVOLOG FLEXPEN) 100 UNIT/ML pen  insulin glargine (LANTUS PEN) 100 UNIT/ML pen  insulin pen needle (BD GABRIELLA U/F) 32G X 4 MM  LANsoprazole (PREVACID SOLUTAB) 15 MG ODT  levothyroxine (SYNTHROID/LEVOTHROID) 88 MCG tablet  levothyroxine (SYNTHROID/LEVOTHROID) 88 MCG tablet  loperamide (IMODIUM) 2 MG capsule  magnesium 250 MG tablet  menthol-zinc oxide (CALMOSEPTINE) 0.44-20.625 % OINT ointment  order for DME  order for DME  order for DME  order for DME  order for DME  order for DME  potassium chloride ER (K-DUR/KLOR-CON M) 10 MEQ CR tablet  pramipexole (MIRAPEX) 0.5 MG tablet  pramipexole (MIRAPEX) 0.5 MG tablet  simvastatin (ZOCOR) 40 MG tablet          Review of Systems   All other systems reviewed and are negative.      Physical Exam   BP: 114/72  Pulse: 61  Heart Rate: 59  Temp: 99.3  F (37.4  C)  Resp: 20  Height: 157.5 cm (5' 2\")  Weight: 91.2 kg (201 lb)  SpO2: 95 %      Physical Exam  Vitals signs reviewed.   Constitutional:       Appearance: Normal appearance. She is obese.   HENT:      Head: Normocephalic and atraumatic.      Right Ear: Tympanic membrane normal.      Left Ear: Tympanic membrane normal.      Nose: Nose normal.      " Mouth/Throat:      Mouth: Mucous membranes are dry.      Pharynx: Oropharynx is clear.   Eyes:      Extraocular Movements: Extraocular movements intact.      Conjunctiva/sclera: Conjunctivae normal.      Pupils: Pupils are equal, round, and reactive to light.   Neck:      Musculoskeletal: Normal range of motion.   Cardiovascular:      Rate and Rhythm: Normal rate and regular rhythm.      Pulses: Normal pulses.      Heart sounds: Normal heart sounds.   Pulmonary:      Comments: Poor inspiratory effort.  Abdominal:      General: Abdomen is flat. Bowel sounds are normal.      Palpations: Abdomen is soft.   Musculoskeletal: Normal range of motion.   Skin:     General: Skin is warm and dry.      Capillary Refill: Capillary refill takes less than 2 seconds.   Neurological:      General: No focal deficit present.      Mental Status: She is alert and oriented to person, place, and time.   Psychiatric:         Mood and Affect: Mood normal.         Behavior: Behavior normal.         ED Course        Procedures               Critical Care time:  none               Results for orders placed or performed during the hospital encounter of 04/24/20 (from the past 24 hour(s))   CBC with platelets differential   Result Value Ref Range    WBC 6.1 4.0 - 11.0 10e9/L    RBC Count 4.21 3.8 - 5.2 10e12/L    Hemoglobin 12.7 11.7 - 15.7 g/dL    Hematocrit 39.4 35.0 - 47.0 %    MCV 94 78 - 100 fl    MCH 30.2 26.5 - 33.0 pg    MCHC 32.2 31.5 - 36.5 g/dL    RDW 13.4 10.0 - 15.0 %    Platelet Count 249 150 - 450 10e9/L    Diff Method Automated Method     % Neutrophils 72.6 %    % Lymphocytes 12.1 %    % Monocytes 8.7 %    % Eosinophils 5.6 %    % Basophils 0.7 %    % Immature Granulocytes 0.3 %    Nucleated RBCs 0 0 /100    Absolute Neutrophil 4.5 1.6 - 8.3 10e9/L    Absolute Lymphocytes 0.7 (L) 0.8 - 5.3 10e9/L    Absolute Monocytes 0.5 0.0 - 1.3 10e9/L    Absolute Eosinophils 0.3 0.0 - 0.7 10e9/L    Absolute Basophils 0.0 0.0 - 0.2 10e9/L     Abs Immature Granulocytes 0.0 0 - 0.4 10e9/L    Absolute Nucleated RBC 0.0    Comprehensive metabolic panel   Result Value Ref Range    Sodium 140 133 - 144 mmol/L    Potassium 4.4 3.4 - 5.3 mmol/L    Chloride 107 94 - 109 mmol/L    Carbon Dioxide 27 20 - 32 mmol/L    Anion Gap 6 3 - 14 mmol/L    Glucose 136 (H) 70 - 99 mg/dL    Urea Nitrogen 26 7 - 30 mg/dL    Creatinine 1.66 (H) 0.52 - 1.04 mg/dL    GFR Estimate 29 (L) >60 mL/min/[1.73_m2]    GFR Estimate If Black 34 (L) >60 mL/min/[1.73_m2]    Calcium 8.8 8.5 - 10.1 mg/dL    Bilirubin Total 0.6 0.2 - 1.3 mg/dL    Albumin 2.7 (L) 3.4 - 5.0 g/dL    Protein Total 6.9 6.8 - 8.8 g/dL    Alkaline Phosphatase 126 40 - 150 U/L    ALT 19 0 - 50 U/L    AST 26 0 - 45 U/L   Lactic acid whole blood   Result Value Ref Range    Lactic Acid 2.1 (H) 0.7 - 2.0 mmol/L   CT Chest w/o Contrast    Narrative    CT CHEST WITHOUT CONTRAST 4/24/2020 4:26 PM    CLINICAL HISTORY: Cough, fever, shortness of breath, right leg  immobilization, history of deep venous thrombosis. Evaluate for  potential pneumonia as well as pulmonary embolism.    TECHNIQUE: CT chest without IV contrast. Multiplanar reformats were  obtained. Dose reduction techniques were used.    CONTRAST: None.    COMPARISON: Chest x-ray 4/10/2020, CT chest, abdomen and pelvis  2/23/2017.    FINDINGS:   LUNGS AND PLEURA: Trace right pleural fluid. Moderate right base  atelectasis or scarring. Some mild groundglass opacities diffusely and  smooth interstitial prominence. There is a newly identified  ill-defined 0.4 cm nodular opacity posterior right upper lobe series 5  image 99. Another new nodule is seen at the right middle lobe  anteriorly measuring 0.6 cm series 5 image 125. Multiple additional  pulmonary nodules noted bilaterally. Several are stable, but several  very small lesions are limited in comparison.    MEDIASTINUM/AXILLAE: Right chest port venous catheter tip ends at the  low SVC. Sternotomy. Diffuse coronary  artery calcifications and/or  stenting. Stable size of the ascending thoracic aorta measuring 4.3 cm  series 2 image 27. No adenopathy is seen within the limits of  unenhanced scanning.    UPPER ABDOMEN: Cholelithiasis. Stable left hepatic cyst series 2 image  55. A right renal cyst also noted appearing larger since 2017, but of  doubtful significance. No specific imaging follow-up is recommended.    MUSCULOSKELETAL: Unremarkable.      Impression    IMPRESSION:   1.  Ill-defined mild groundglass opacities within the bilateral lungs  may relate to pulmonary edema. Atypical infectious etiologies are also  possible.  2.  Moderate right base atelectasis and/or scarring.  3.  Multiple bilateral pulmonary nodules noted. Several appear to be  new since 2017. Therefore, neoplasm is a possibility including  metastatic disease. Recommend short interval follow-up CT chest in  three months.  4.  Diffuse coronary artery calcifications and/or stenting.  5.  Trace right pleural fluid.  6.  Stable ectasia of the ascending thoracic aorta measuring 4.3 cm.   Lactic acid whole blood   Result Value Ref Range    Lactic Acid 0.9 0.7 - 2.0 mmol/L     *Note: Due to a large number of results and/or encounters for the requested time period, some results have not been displayed. A complete set of results can be found in Results Review.       Medications   iopamidol (ISOVUE-370) solution 85 mL (has no administration in time range)   sodium chloride 0.9 % bag 500mL for CT scan flush use (has no administration in time range)   piperacillin-tazobactam (ZOSYN) infusion 3.375 g (0 g Intravenous Stopped 4/24/20 5963)   0.9% sodium chloride BOLUS (0 mLs Intravenous Stopped 4/24/20 1336)   0.9% sodium chloride BOLUS (1,000 mLs Intravenous New Bag 4/24/20 1337)   6:02 PM  All results were reviewed with the patient in the room via iPad.  Her questions were answered and she is comfortable with the plan for admission.  I spoke with our hospitalist  Franchesca West guard who will admit the patient.  I have placed transition orders in the emergency department.    Assessments & Plan (with Medical Decision Making)     I have reviewed the nursing notes.    I have reviewed the findings, diagnosis, plan and need for follow up with the patient.       New Prescriptions    No medications on file       Final diagnoses:   Atypical pneumonia - Atypical bacterial/COVID-19       4/24/2020   [facility] EMERGENCY DEPARTMENT     Gerald Tidwell MD  04/24/20 1945     Additional Progress Note...

## 2021-03-09 NOTE — ED PROVIDER NOTE - DISCUSSED CLINICAL AND RADIOLOGICAL FINDINGS WITH, MDM
GENERAL: no acute distress, non-toxic appearing  HEAD: normocephalic, atraumatic  HEENT: normal conjunctiva, oral mucosa moist, neck supple  CARDIAC: regular rate and rhythm, normal S1 and S2,  no appreciable murmurs  PULM: clear to ascultation bilaterally, no crackles, rales, rhonchi, or wheezing  GI: abdomen nondistended, soft, nontender, no guarding or rebound tenderness  : no CVA tenderness, no suprapubic tenderness  NEURO: alert and oriented x 3, normal speech, PERRLA, EOMI, no focal motor or sensory deficits    MSK: no visible deformities, no peripheral edema, calf tenderness/redness/swelling  (+) R knee swollen, no valgus/varus laxity, negative lachmans, negative anatoliy, full active and passive ROM hip, knee, and ankle.     SKIN: no visible rashes, dry, well-perfused  PSYCH: appropriate mood and affect family

## 2022-01-27 NOTE — ED ADULT NURSE NOTE - GASTROINTESTINAL WDL
Sophie Hernandez PGY 3
Abdomen soft, nontender, nondistended, bowel sounds present in all 4 quadrants.

## 2022-03-01 NOTE — ED PROVIDER NOTE - RESPIRATORY, MLM
Breath sounds clear and equal bilaterally. Xenograft Text: The defect edges were debeveled with a #15 scalpel blade.  Given the location of the defect, shape of the defect and the proximity to free margins a xenograft was deemed most appropriate.  The graft was then trimmed to fit the size of the defect.  The graft was then placed in the primary defect and oriented appropriately.

## 2024-05-30 NOTE — ED PROVIDER NOTE - INTERPRETATION
Medication passed protocol.    Disp Refills Start End    LamoTRIgine (LaMICtal) 200 MG tablet 180 tablet 0 3/4/2024 --    Sig - Route: Take 1 tablet by mouth 2 times daily.       Last office visit date: 4/9/24    Next appointment scheduled?: Yes   Number of refills given: 180 w/ 0 refills  
normal sinus rhythm

## 2025-05-05 NOTE — ED ADULT NURSE NOTE - NSFALLRSKUNASSIST_ED_ALL_ED
Jt Meredith MD        PATIENT NAME: Yasmeen Quintanilla  : 1956  DATE: 25  MRN: [id number]      Billing Provider: Jt Meredith MD  Level of Service: LA OFFICE/OUTPT VISIT, EST, LEVODALIS IV, 30-39 MIN  Patient PCP Information       Provider PCP Type    Jt Meredith MD General            Reason for Visit / Chief Complaint: Diabetes (Check up) and Pain (refill)       Update PCP  Update Chief Complaint         History of Present Illness / Problem Focused Workflow     Yasmeen Quintanilla presents to the clinic with Diabetes (Check up) and Pain (refill)     Routine followup.  No significant interval change.  Bilat knee replacements which still hurt greatly.      Diabetes  Pertinent negatives for hypoglycemia include no confusion, dizziness, headaches, nervousness/anxiousness, seizures or tremors. Pertinent negatives for diabetes include no chest pain, no polydipsia, no polyphagia, no polyuria and no weakness (global weakness).   Pain  Associated symptoms include arthralgias. Pertinent negatives include no abdominal pain, chest pain, congestion, coughing, fever, headaches, nausea, neck pain, rash, sore throat or weakness (global weakness).     Review of Systems     Review of Systems   Constitutional:  Negative for activity change, appetite change, fever and unexpected weight change.   HENT:  Negative for congestion, rhinorrhea, sinus pressure, sinus pain, sore throat and trouble swallowing.    Eyes:  Negative for photophobia, pain, discharge and visual disturbance.   Respiratory:  Negative for cough, chest tightness, wheezing and stridor.    Cardiovascular:  Negative for chest pain, palpitations and leg swelling.   Gastrointestinal:  Negative for abdominal pain, blood in stool, constipation, diarrhea and nausea.   Endocrine: Negative for polydipsia, polyphagia and polyuria.   Genitourinary:  Negative for difficulty urinating, flank pain and hematuria.   Musculoskeletal:  Positive for arthralgias. Negative for neck  pain.   Skin:  Negative for rash.   Allergic/Immunologic: Negative for food allergies.   Neurological:  Negative for dizziness, tremors, seizures, syncope, weakness (global weakness) and headaches.   Psychiatric/Behavioral:  Negative for behavioral problems, confusion, decreased concentration, dysphoric mood and hallucinations. The patient is not nervous/anxious.         Medical / Social / Family History     Past Medical History:   Diagnosis Date    Chronic anemia     Diabetes mellitus, type 2     Diabetic eye exam 09/05/2023    Dr. Thomas Lynn - [facility]    Diabetic eye exam 09/24/2024    Dr. Thomas [facility] - [facility]    Encounter for long-term (current) use of other medications     Gastroesophageal reflux disease without esophagitis     Hx of mammogram 10/24/2017    Hyperlipidemia     Hypertension     Osteoarthritis of knee, unspecified     Snoring     Vitamin D deficiency        Past Surgical History:   Procedure Laterality Date    ARTHROPLASTY, KNEE, TOTAL, USING COMPUTER-ASSISTED NAVIGATION Right 02/02/2023    Procedure: ARTHROPLASTY, KNEE, TOTAL, USING COMPUTER-ASSISTED NAVIGATION;  Surgeon: Jt Culver MD;  Location: [facility];  Service: Orthopedics;  Laterality: Right;    HYSTERECTOMY      OOPHORECTOMY      SIGMOIDOSCOPY  08/02/2016    Dr. Saul Kline - [place]    SUBTOTAL COLECTOMY         Social History  Ms.  reports that she has never smoked. She has never used smokeless tobacco. She reports current alcohol use. She reports that she does not use drugs.    Family History  MsShari's family history includes Diabetes in her mother and sister; Hypertension in her father, mother, and sister.    Medications and Allergies     Medications  No outpatient medications have been marked as taking for the 5/5/25 encounter (Office Visit) with Jt Meredith MD.       Allergies  Review of patient's allergies indicates:  No Known Allergies    Physical Examination     Vitals:    05/05/25  0838   BP: (!) 161/89   Pulse:    Resp:    Temp:      Physical Exam  Constitutional:       General: She is not in acute distress.     Appearance: Normal appearance.   HENT:      Head: Normocephalic.      Right Ear: Tympanic membrane and ear canal normal.      Left Ear: Tympanic membrane and ear canal normal.      Nose: Nose normal.      Mouth/Throat:      Mouth: Mucous membranes are moist.      Pharynx: No oropharyngeal exudate.   Eyes:      Extraocular Movements: Extraocular movements intact.      Pupils: Pupils are equal, round, and reactive to light.   Cardiovascular:      Rate and Rhythm: Normal rate and regular rhythm.      Heart sounds: No murmur heard.  Pulmonary:      Effort: Pulmonary effort is normal.      Breath sounds: Normal breath sounds. No wheezing.   Abdominal:      General: Abdomen is flat. Bowel sounds are normal.      Palpations: Abdomen is soft.      Hernia: No hernia is present.   Musculoskeletal:         General: Normal range of motion.      Cervical back: Normal range of motion and neck supple.      Right lower leg: No edema.      Left lower leg: No edema.   Lymphadenopathy:      Cervical: No cervical adenopathy.   Skin:     General: Skin is warm and dry.      Coloration: Skin is not jaundiced.      Findings: No lesion.   Neurological:      General: No focal deficit present.      Mental Status: She is alert and oriented to person, place, and time.      Cranial Nerves: No cranial nerve deficit.      Gait: Gait normal.   Psychiatric:         Mood and Affect: Mood normal.         Behavior: Behavior normal.         Judgment: Judgment normal.          Assessment and Plan (including Health Maintenance)      Problem List  Smart Sets  Document Outside HM   :    Plan:     1. Type 2 diabetes mellitus without complication, without long-term current use of insulin  The current medical regimen is effective;  continue present plan and medications.  -     Hemoglobin A1C  -     Microalbumin/Creatinine Ratio,  Urine    2. Primary hypertension  The current medical regimen is effective;  continue present plan and medications.  -     CBC Auto Differential  -     Comprehensive Metabolic Panel  -     Lipid Panel    3. Encounter for long-term (current) drug use  The current medical regimen is effective;  continue present plan and medications.  -     POCT Urine Drug Screen Presump    4. Chronic arthritis  The current medical regimen is effective;  continue present plan and medications.    5. Mixed hyperlipidemia  The current medical regimen is effective;  continue present plan and medications.          Health Maintenance Due   Topic Date Due    TETANUS VACCINE  Never done    Shingles Vaccine (1 of 2) Never done    RSV Vaccine (Age 60+ and Pregnant patients) (1 - Risk 60-74 years 1-dose series) Never done    COVID-19 Vaccine (4 - 2024-25 season) 09/01/2024    Diabetes Urine Screening  11/07/2024    Foot Exam  11/07/2024    Hemoglobin A1c  05/04/2025       1. Type 2 diabetes mellitus without complication, without long-term current use of insulin  -     Hemoglobin A1C  -     Microalbumin/Creatinine Ratio, Urine  -     blood sugar diagnostic (TRUE METRIX GLUCOSE TEST STRIP) Strp; Use to check blood sugar daily  Dispense: 100 strip; Refill: 11    2. Primary hypertension  -     CBC Auto Differential  -     Comprehensive Metabolic Panel  -     Lipid Panel  -     amLODIPine (NORVASC) 10 MG tablet; Take 1 tablet (10 mg total) by mouth once daily.  Dispense: 90 tablet; Refill: 3  -     carvediloL (COREG) 12.5 MG tablet; Take 1 tablet (12.5 mg total) by mouth 2 (two) times daily.  Dispense: 180 tablet; Refill: 3  -     lisinopriL-hydrochlorothiazide (PRINZIDE,ZESTORETIC) 20-12.5 mg per tablet; Take 1 tablet by mouth once daily.  Dispense: 90 tablet; Refill: 3    3. Encounter for long-term (current) drug use  -     POCT Urine Drug Screen Presump    4. Chronic arthritis  -     Discontinue: HYDROcodone-acetaminophen (NORCO)  mg per  tablet; Take 1 tablet by mouth 3 (three) times daily.  Dispense: 90 tablet; Refill: 0  -     Discontinue: HYDROcodone-acetaminophen (NORCO)  mg per tablet; Take 1 tablet by mouth 3 (three) times daily.  Dispense: 90 tablet; Refill: 0  -     HYDROcodone-acetaminophen (NORCO)  mg per tablet; Take 1 tablet by mouth 3 (three) times daily.  Dispense: 90 tablet; Refill: 0    5. Mixed hyperlipidemia  -     atorvastatin (LIPITOR) 10 MG tablet; Take 1 tablet (10 mg total) by mouth once daily.  Dispense: 90 tablet; Refill: 3    Other orders  -     omeprazole (PRILOSEC) 20 MG capsule; Take 1 capsule (20 mg total) by mouth 2 (two) times daily.  Dispense: 180 capsule; Refill: 3         Health Maintenance Topics with due status: Not Due       Topic Last Completion Date    Colorectal Cancer Screening 08/02/2016    DEXA Scan 02/01/2023    Influenza Vaccine 11/07/2023    Diabetic Eye Exam 09/24/2024    Lipid Panel 11/04/2024    Mammogram 12/03/2024    Low Dose Statin 05/05/2025       Future Appointments   Date Time Provider Department Center   8/6/2025  9:00 AM Ruchi Landin, FNP [facility]   12/9/2025  8:00 AM [facility]        There are no Patient Instructions on file for this visit.  Follow up in about 3 months (around 8/5/2025) for routine followup.     Signature:  Jt Meredith MD      Date of encounter: 5/5/25     no